# Patient Record
Sex: FEMALE | Race: WHITE | NOT HISPANIC OR LATINO | Employment: OTHER | URBAN - METROPOLITAN AREA
[De-identification: names, ages, dates, MRNs, and addresses within clinical notes are randomized per-mention and may not be internally consistent; named-entity substitution may affect disease eponyms.]

---

## 2022-06-27 LAB
ALBUMIN: 3.8 G/DL (ref 3.4–5)
ALP BLD-CCNC: 150 U/L (ref 33–136)
ALT SERPL-CCNC: 80 U/L (ref 7–45)
AST SERPL-CCNC: 119 U/L (ref 9–39)
BILIRUB SERPL-MCNC: 0.9 MG/DL (ref 0–1.2)
BILIRUBIN DIRECT: 0.2 MG/DL (ref 0–0.3)
INR BLD: 0.9 (ref 0.9–1.1)
PROTHROMBIN TIME: 10.4 SEC (ref 9.8–13.4)
TOTAL PROTEIN: 6.9 G/DL (ref 6.4–8.2)

## 2024-01-23 ENCOUNTER — OFFICE VISIT (OUTPATIENT)
Dept: PRIMARY CARE | Facility: CLINIC | Age: 64
End: 2024-01-23
Payer: COMMERCIAL

## 2024-01-23 VITALS
TEMPERATURE: 96.8 F | HEART RATE: 74 BPM | WEIGHT: 155.5 LBS | BODY MASS INDEX: 24.4 KG/M2 | OXYGEN SATURATION: 96 % | DIASTOLIC BLOOD PRESSURE: 68 MMHG | RESPIRATION RATE: 16 BRPM | HEIGHT: 67 IN | SYSTOLIC BLOOD PRESSURE: 124 MMHG

## 2024-01-23 DIAGNOSIS — K70.30 ALCOHOLIC CIRRHOSIS OF LIVER WITHOUT ASCITES (MULTI): ICD-10-CM

## 2024-01-23 DIAGNOSIS — C50.219 MALIGNANT NEOPLASM OF UPPER-INNER QUADRANT OF FEMALE BREAST, UNSPECIFIED ESTROGEN RECEPTOR STATUS, UNSPECIFIED LATERALITY (MULTI): ICD-10-CM

## 2024-01-23 DIAGNOSIS — F10.20 ALCOHOLISM (MULTI): ICD-10-CM

## 2024-01-23 DIAGNOSIS — I10 HYPERTENSION, UNSPECIFIED TYPE: ICD-10-CM

## 2024-01-23 DIAGNOSIS — M25.541 ARTHRALGIA OF BOTH HANDS: ICD-10-CM

## 2024-01-23 DIAGNOSIS — M25.542 ARTHRALGIA OF BOTH HANDS: ICD-10-CM

## 2024-01-23 DIAGNOSIS — F32.2 MODERATELY SEVERE MAJOR DEPRESSION (MULTI): ICD-10-CM

## 2024-01-23 DIAGNOSIS — J44.9 CHRONIC OBSTRUCTIVE PULMONARY DISEASE, UNSPECIFIED COPD TYPE (MULTI): ICD-10-CM

## 2024-01-23 DIAGNOSIS — E55.9 VITAMIN D DEFICIENCY: ICD-10-CM

## 2024-01-23 DIAGNOSIS — R74.8 ELEVATED LIVER ENZYMES: ICD-10-CM

## 2024-01-23 DIAGNOSIS — G61.81 CHRONIC INFLAMMATORY DEMYELINATING POLYNEUROPATHY (MULTI): ICD-10-CM

## 2024-01-23 DIAGNOSIS — N89.8 VAGINAL IRRITATION: ICD-10-CM

## 2024-01-23 DIAGNOSIS — M25.512 CHRONIC LEFT SHOULDER PAIN: Primary | ICD-10-CM

## 2024-01-23 DIAGNOSIS — G89.29 CHRONIC LEFT SHOULDER PAIN: Primary | ICD-10-CM

## 2024-01-23 DIAGNOSIS — K21.9 GASTROESOPHAGEAL REFLUX DISEASE WITHOUT ESOPHAGITIS: ICD-10-CM

## 2024-01-23 PROBLEM — F41.9 ANXIETY: Status: ACTIVE | Noted: 2024-01-23

## 2024-01-23 PROBLEM — M25.571 RIGHT ANKLE PAIN: Status: ACTIVE | Noted: 2024-01-23

## 2024-01-23 PROBLEM — S82.409A FRACTURE, TIBIA AND FIBULA: Status: ACTIVE | Noted: 2024-01-23

## 2024-01-23 PROBLEM — M17.31 POST-TRAUMATIC OSTEOARTHRITIS OF RIGHT KNEE: Status: ACTIVE | Noted: 2024-01-23

## 2024-01-23 PROBLEM — R53.82 CHRONIC FATIGUE, UNSPECIFIED: Status: ACTIVE | Noted: 2024-01-23

## 2024-01-23 PROBLEM — K25.4 CHRONIC GASTRIC ULCER WITH BLEEDING: Status: ACTIVE | Noted: 2024-01-23

## 2024-01-23 PROBLEM — K92.2 ACUTE GASTROINTESTINAL HEMORRHAGE: Status: ACTIVE | Noted: 2024-01-23

## 2024-01-23 PROBLEM — K61.1 PERIRECTAL ABSCESS: Status: ACTIVE | Noted: 2021-01-23

## 2024-01-23 PROBLEM — G60.9 IDIOPATHIC PERIPHERAL NEUROPATHY: Status: ACTIVE | Noted: 2024-01-23

## 2024-01-23 PROBLEM — S82.841A BIMALLEOLAR FRACTURE OF RIGHT ANKLE: Status: ACTIVE | Noted: 2024-01-23

## 2024-01-23 PROBLEM — R26.2 DIFFICULTY WALKING: Status: ACTIVE | Noted: 2024-01-23

## 2024-01-23 PROBLEM — F32.A DEPRESSION: Status: ACTIVE | Noted: 2024-01-23

## 2024-01-23 PROBLEM — D17.1 LIPOMA OF BUTTOCK: Status: ACTIVE | Noted: 2024-01-23

## 2024-01-23 PROBLEM — S82.209A FRACTURE, TIBIA AND FIBULA: Status: ACTIVE | Noted: 2024-01-23

## 2024-01-23 PROBLEM — M12.569 TRAUMATIC ARTHROPATHY OF KNEE: Status: ACTIVE | Noted: 2024-01-23

## 2024-01-23 PROBLEM — G25.81 RESTLESS LEGS SYNDROME (RLS): Status: ACTIVE | Noted: 2024-01-23

## 2024-01-23 PROBLEM — S69.90XA WRIST INJURY: Status: ACTIVE | Noted: 2024-01-23

## 2024-01-23 PROBLEM — M25.529 ELBOW PAIN: Status: ACTIVE | Noted: 2024-01-23

## 2024-01-23 PROBLEM — M10.9 GOUT: Status: ACTIVE | Noted: 2024-01-23

## 2024-01-23 PROBLEM — M76.821 POSTERIOR TIBIAL TENDINITIS OF RIGHT LEG: Status: ACTIVE | Noted: 2024-01-23

## 2024-01-23 PROBLEM — S99.919A ANKLE INJURY: Status: ACTIVE | Noted: 2024-01-23

## 2024-01-23 PROBLEM — F10.11 HISTORY OF ALCOHOL ABUSE: Status: ACTIVE | Noted: 2024-01-23

## 2024-01-23 PROBLEM — Z85.3 PERSONAL HISTORY OF MALIGNANT NEOPLASM OF BREAST: Status: ACTIVE | Noted: 2024-01-23

## 2024-01-23 PROBLEM — S82.871A PILON FRACTURE OF RIGHT TIBIA: Status: ACTIVE | Noted: 2024-01-23

## 2024-01-23 PROBLEM — F17.200 CURRENT SMOKER: Status: ACTIVE | Noted: 2024-01-23

## 2024-01-23 PROBLEM — S82.141D: Status: ACTIVE | Noted: 2024-01-23

## 2024-01-23 PROBLEM — S76.311A HAMSTRING STRAIN, RIGHT, INITIAL ENCOUNTER: Status: ACTIVE | Noted: 2024-01-23

## 2024-01-23 PROBLEM — M25.561 RIGHT KNEE PAIN: Status: ACTIVE | Noted: 2024-01-23

## 2024-01-23 PROCEDURE — 4004F PT TOBACCO SCREEN RCVD TLK: CPT | Performed by: NURSE PRACTITIONER

## 2024-01-23 PROCEDURE — 3078F DIAST BP <80 MM HG: CPT | Performed by: NURSE PRACTITIONER

## 2024-01-23 PROCEDURE — 99204 OFFICE O/P NEW MOD 45 MIN: CPT | Performed by: NURSE PRACTITIONER

## 2024-01-23 PROCEDURE — 3074F SYST BP LT 130 MM HG: CPT | Performed by: NURSE PRACTITIONER

## 2024-01-23 RX ORDER — MELOXICAM 7.5 MG/1
7.5 TABLET ORAL DAILY
Qty: 30 TABLET | Refills: 0 | Status: SHIPPED | OUTPATIENT
Start: 2024-01-23 | End: 2024-02-22

## 2024-01-23 RX ORDER — LORAZEPAM 1 MG/1
1 TABLET ORAL 2 TIMES DAILY PRN
COMMUNITY
End: 2024-04-04 | Stop reason: SDUPTHER

## 2024-01-23 RX ORDER — NYSTATIN 100000 U/G
CREAM TOPICAL 2 TIMES DAILY
Qty: 30 G | Refills: 0 | Status: SHIPPED | OUTPATIENT
Start: 2024-01-23 | End: 2024-01-30

## 2024-01-23 RX ORDER — ROPINIROLE 2 MG/1
TABLET, FILM COATED ORAL EVERY 24 HOURS
COMMUNITY
End: 2024-02-11 | Stop reason: SDUPTHER

## 2024-01-23 ASSESSMENT — PATIENT HEALTH QUESTIONNAIRE - PHQ9
SUM OF ALL RESPONSES TO PHQ9 QUESTIONS 1 AND 2: 0
1. LITTLE INTEREST OR PLEASURE IN DOING THINGS: NOT AT ALL
2. FEELING DOWN, DEPRESSED OR HOPELESS: NOT AT ALL

## 2024-01-23 ASSESSMENT — ENCOUNTER SYMPTOMS: ARTHRALGIAS: 1

## 2024-01-23 NOTE — PROGRESS NOTES
Subjective   Kylie Duong is a 63 y.o. female who presents for Arthritis (Arthritis). New patient appointment, referred by her sister Nereyda.     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 Her  committed suicide in 2017, he was manic depressive. She sips on alcohol throughout the day, and smokes a pack of cigarettes per day for the past 50 years/    Patient has a PCP dr Dmitriy Epstein, but would like to change PCP), states she did see Ferny few years ago   Arthritis- in AM fingers ,elbows, hips , pain , getting better during the day x 1 year ,last march 2023,she fell and hurt the head on the concrete, did not go to hosp at that time, she did see her PCP couple weeks later since that time she is been having problems   Rash , private are ,burning when using the bathroom x  1 month , lumps too, no pain now , no discharge ,  2 y ago she had SX procedure for the lumps and did not work    Arthritis  Presents for initial visit. The disease course has been stable. The condition has lasted for 1 year. She complains of pain. Affected locations include the right elbow, left elbow, right hip and left hip (fingers too). Associated symptoms include rash.     Review of Systems   Musculoskeletal:  Positive for arthralgias and arthritis.   Skin:  Positive for rash.       Objective   Physical Exam  Constitutional:       General: She is not in acute distress.     Appearance: She is not ill-appearing.   HENT:      Head: Normocephalic and atraumatic.      Mouth/Throat:      Mouth: Mucous membranes are moist.      Pharynx: Oropharynx is clear.   Eyes:      Conjunctiva/sclera: Conjunctivae normal.      Pupils: Pupils are equal, round, and reactive to light.   Cardiovascular:      Rate and Rhythm: Normal rate and regular rhythm.      Pulses: Normal pulses.      Heart sounds: Normal heart sounds.   Pulmonary:      Effort: Pulmonary effort is normal. No respiratory distress.      Breath  "sounds: Normal breath sounds.   Abdominal:      General: Bowel sounds are normal.      Palpations: Abdomen is soft.      Tenderness: There is no abdominal tenderness.   Musculoskeletal:         General: Normal range of motion.   Skin:     General: Skin is warm and dry.      Findings: Rash present. Rash is macular, scaling and urticarial.      Comments: Along underwear line   Neurological:      General: No focal deficit present.      Mental Status: She is alert and oriented to person, place, and time.   Psychiatric:         Mood and Affect: Mood normal.         Behavior: Behavior normal.         Thought Content: Thought content normal.         Judgment: Judgment normal.       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)    Moderately severe major depression (CMS/HCC)    Chronic obstructive pulmonary disease (CMS/HCC)    Relevant Orders    CBC and Auto Differential (Completed)    Lipid Panel (Completed)    Chronic inflammatory demyelinating polyneuropathy (CMS/HCC)    Alcoholic cirrhosis (CMS/HCC)    Alcoholism (CMS/HCC)     Other Visit Diagnoses       Chronic left shoulder pain    -  Primary    Relevant Medications    meloxicam (Mobic) 7.5 mg tablet    Other Relevant Orders    ZAY with Reflex to RAY (Completed)    Citrulline Antibody, IgG (Completed)    C-Reactive Protein (Completed)    Sedimentation Rate (Completed)    Rheumatoid Factor (Completed)    Vaginal irritation        Vitamin D deficiency        " Relevant Orders    Vitamin D 25-Hydroxy,Total (for eval of Vitamin D levels) (Completed)    Arthralgia of both hands        Relevant Orders    ZAY with Reflex to RAY (Completed)    Citrulline Antibody, IgG (Completed)    C-Reactive Protein (Completed)    Sedimentation Rate (Completed)    Rheumatoid Factor (Completed)          Patient Instructions   Patient to start taking mobic and nystatin daily as ordered. Have fasting labs drawn, and we will call with results when available. Follow-up in 1 month for physical, or sooner if needed. Call the office if any problems or concerns in the meantime.     More than 50% of the visit was spent counseling the patient. A total of more than 60 minutes was spent.

## 2024-01-24 ENCOUNTER — LAB (OUTPATIENT)
Dept: LAB | Facility: LAB | Age: 64
End: 2024-01-24
Payer: COMMERCIAL

## 2024-01-24 DIAGNOSIS — G89.29 CHRONIC LEFT SHOULDER PAIN: ICD-10-CM

## 2024-01-24 DIAGNOSIS — M25.542 ARTHRALGIA OF BOTH HANDS: ICD-10-CM

## 2024-01-24 DIAGNOSIS — E55.9 VITAMIN D DEFICIENCY: ICD-10-CM

## 2024-01-24 DIAGNOSIS — M25.512 CHRONIC LEFT SHOULDER PAIN: ICD-10-CM

## 2024-01-24 DIAGNOSIS — R74.8 ELEVATED LIVER ENZYMES: ICD-10-CM

## 2024-01-24 DIAGNOSIS — J44.9 CHRONIC OBSTRUCTIVE PULMONARY DISEASE, UNSPECIFIED COPD TYPE (MULTI): ICD-10-CM

## 2024-01-24 DIAGNOSIS — I10 HYPERTENSION, UNSPECIFIED TYPE: ICD-10-CM

## 2024-01-24 DIAGNOSIS — M25.541 ARTHRALGIA OF BOTH HANDS: ICD-10-CM

## 2024-01-24 LAB
25(OH)D3 SERPL-MCNC: 16 NG/ML (ref 30–100)
ALBUMIN SERPL BCP-MCNC: 3.9 G/DL (ref 3.4–5)
ALP SERPL-CCNC: 92 U/L (ref 33–136)
ALT SERPL W P-5'-P-CCNC: 11 U/L (ref 7–45)
ANION GAP SERPL CALC-SCNC: 12 MMOL/L (ref 10–20)
AST SERPL W P-5'-P-CCNC: 15 U/L (ref 9–39)
BASOPHILS # BLD AUTO: 0.05 X10*3/UL (ref 0–0.1)
BASOPHILS NFR BLD AUTO: 0.8 %
BILIRUB SERPL-MCNC: 0.8 MG/DL (ref 0–1.2)
BUN SERPL-MCNC: 12 MG/DL (ref 6–23)
CALCIUM SERPL-MCNC: 9.3 MG/DL (ref 8.6–10.3)
CHLORIDE SERPL-SCNC: 104 MMOL/L (ref 98–107)
CHOLEST SERPL-MCNC: 234 MG/DL (ref 0–199)
CHOLESTEROL/HDL RATIO: 5.3
CO2 SERPL-SCNC: 31 MMOL/L (ref 21–32)
CREAT SERPL-MCNC: 0.92 MG/DL (ref 0.5–1.05)
CRP SERPL-MCNC: 0.34 MG/DL
EGFRCR SERPLBLD CKD-EPI 2021: 70 ML/MIN/1.73M*2
EOSINOPHIL # BLD AUTO: 0.37 X10*3/UL (ref 0–0.7)
EOSINOPHIL NFR BLD AUTO: 6 %
ERYTHROCYTE [DISTWIDTH] IN BLOOD BY AUTOMATED COUNT: 14.6 % (ref 11.5–14.5)
ERYTHROCYTE [SEDIMENTATION RATE] IN BLOOD BY WESTERGREN METHOD: 14 MM/H (ref 0–30)
GLUCOSE SERPL-MCNC: 103 MG/DL (ref 74–99)
HCT VFR BLD AUTO: 46.1 % (ref 36–46)
HDLC SERPL-MCNC: 43.9 MG/DL
HGB BLD-MCNC: 16 G/DL (ref 12–16)
IMM GRANULOCYTES # BLD AUTO: 0.01 X10*3/UL (ref 0–0.7)
IMM GRANULOCYTES NFR BLD AUTO: 0.2 % (ref 0–0.9)
LDLC SERPL CALC-MCNC: 148 MG/DL
LYMPHOCYTES # BLD AUTO: 2.15 X10*3/UL (ref 1.2–4.8)
LYMPHOCYTES NFR BLD AUTO: 34.7 %
MCH RBC QN AUTO: 37.7 PG (ref 26–34)
MCHC RBC AUTO-ENTMCNC: 34.7 G/DL (ref 32–36)
MCV RBC AUTO: 109 FL (ref 80–100)
MONOCYTES # BLD AUTO: 0.45 X10*3/UL (ref 0.1–1)
MONOCYTES NFR BLD AUTO: 7.3 %
NEUTROPHILS # BLD AUTO: 3.17 X10*3/UL (ref 1.2–7.7)
NEUTROPHILS NFR BLD AUTO: 51 %
NON HDL CHOLESTEROL: 190 MG/DL (ref 0–149)
NRBC BLD-RTO: 0 /100 WBCS (ref 0–0)
PLATELET # BLD AUTO: 255 X10*3/UL (ref 150–450)
POTASSIUM SERPL-SCNC: 3.8 MMOL/L (ref 3.5–5.3)
PROT SERPL-MCNC: 6.5 G/DL (ref 6.4–8.2)
RBC # BLD AUTO: 4.24 X10*6/UL (ref 4–5.2)
SODIUM SERPL-SCNC: 143 MMOL/L (ref 136–145)
TRIGL SERPL-MCNC: 212 MG/DL (ref 0–149)
TSH SERPL-ACNC: 2.2 MIU/L (ref 0.44–3.98)
VLDL: 42 MG/DL (ref 0–40)
WBC # BLD AUTO: 6.2 X10*3/UL (ref 4.4–11.3)

## 2024-01-24 PROCEDURE — 86431 RHEUMATOID FACTOR QUANT: CPT

## 2024-01-24 PROCEDURE — 80061 LIPID PANEL: CPT

## 2024-01-24 PROCEDURE — 82306 VITAMIN D 25 HYDROXY: CPT

## 2024-01-24 PROCEDURE — 85025 COMPLETE CBC W/AUTO DIFF WBC: CPT

## 2024-01-24 PROCEDURE — 84443 ASSAY THYROID STIM HORMONE: CPT

## 2024-01-24 PROCEDURE — 86140 C-REACTIVE PROTEIN: CPT

## 2024-01-24 PROCEDURE — 80053 COMPREHEN METABOLIC PANEL: CPT

## 2024-01-24 PROCEDURE — 86200 CCP ANTIBODY: CPT

## 2024-01-24 PROCEDURE — 36415 COLL VENOUS BLD VENIPUNCTURE: CPT

## 2024-01-24 PROCEDURE — 86038 ANTINUCLEAR ANTIBODIES: CPT

## 2024-01-24 PROCEDURE — 85652 RBC SED RATE AUTOMATED: CPT

## 2024-01-25 LAB
ANA SER QL HEP2 SUBST: NEGATIVE
CCP IGG SERPL-ACNC: <1 U/ML
RHEUMATOID FACT SER NEPH-ACNC: <10 IU/ML (ref 0–15)

## 2024-02-06 ENCOUNTER — OFFICE VISIT (OUTPATIENT)
Dept: PRIMARY CARE | Facility: CLINIC | Age: 64
End: 2024-02-06
Payer: COMMERCIAL

## 2024-02-06 VITALS
HEART RATE: 75 BPM | WEIGHT: 153.8 LBS | DIASTOLIC BLOOD PRESSURE: 62 MMHG | RESPIRATION RATE: 18 BRPM | BODY MASS INDEX: 24.14 KG/M2 | SYSTOLIC BLOOD PRESSURE: 130 MMHG | OXYGEN SATURATION: 98 % | HEIGHT: 67 IN | TEMPERATURE: 98 F

## 2024-02-06 DIAGNOSIS — L20.9 ATOPIC DERMATITIS IN ADULT: Primary | ICD-10-CM

## 2024-02-06 PROCEDURE — 4004F PT TOBACCO SCREEN RCVD TLK: CPT | Performed by: NURSE PRACTITIONER

## 2024-02-06 PROCEDURE — 3075F SYST BP GE 130 - 139MM HG: CPT | Performed by: NURSE PRACTITIONER

## 2024-02-06 PROCEDURE — 3078F DIAST BP <80 MM HG: CPT | Performed by: NURSE PRACTITIONER

## 2024-02-06 PROCEDURE — 99212 OFFICE O/P EST SF 10 MIN: CPT | Performed by: NURSE PRACTITIONER

## 2024-02-06 RX ORDER — CLOBETASOL PROPIONATE 0.5 MG/G
CREAM TOPICAL 2 TIMES DAILY
Qty: 15 G | Refills: 0 | Status: SHIPPED | OUTPATIENT
Start: 2024-02-06 | End: 2024-02-20

## 2024-02-06 RX ORDER — KETOCONAZOLE 20 MG/G
CREAM TOPICAL 2 TIMES DAILY
Qty: 30 G | Refills: 0 | Status: SHIPPED | OUTPATIENT
Start: 2024-02-06 | End: 2024-02-20

## 2024-02-06 ASSESSMENT — ENCOUNTER SYMPTOMS: ROS SKIN COMMENTS: AS PER HPI

## 2024-02-06 NOTE — PROGRESS NOTES
"Subjective   Patient ID: Kylie Duong is a 63 y.o. female who presents for Rash (LEFT HAND ON THE PALM. ITCHES AND BURNS/COUPLE OF WEEKS. STARTED TO LOOK LIKE A DOT AND KEEPS SPREADING).    The rash is limited to her LT palm, started out as one red dot and is spreading but clumped to one area, there are red patches, scaling areas. She states it hurts and itches. May be seborrheic dermatitis verse atopic.         Review of Systems   Skin:  Positive for rash.        As per HPI       Objective   /62   Pulse 75   Temp 36.7 °C (98 °F)   Resp 18   Ht 1.697 m (5' 6.8\")   Wt 69.8 kg (153 lb 12.8 oz)   SpO2 98%   BMI 24.23 kg/m²     Physical Exam  Vitals and nursing note reviewed.   Constitutional:       General: She is not in acute distress.  HENT:      Head: Normocephalic and atraumatic.   Eyes:      Pupils: Pupils are equal, round, and reactive to light.   Pulmonary:      Effort: Pulmonary effort is normal.   Skin:     General: Skin is warm and dry.      Findings: Erythema and rash present.      Comments: Clumped area on Left palm of red, scaly patches   Neurological:      Mental Status: She is alert.   Psychiatric:         Mood and Affect: Mood normal.         Assessment/Plan   Problem List Items Addressed This Visit    None  Visit Diagnoses         Codes    Atopic dermatitis in adult    -  Primary L20.9    verses seborrheic dermatitis  To use both creams BID x 14 days  If not improving please call     Relevant Medications    clobetasol (Temovate) 0.05 % cream    ketoconazole (NIZOral) 2 % cream               "

## 2024-02-09 ENCOUNTER — TELEPHONE (OUTPATIENT)
Dept: PRIMARY CARE | Facility: CLINIC | Age: 64
End: 2024-02-09
Payer: COMMERCIAL

## 2024-02-09 NOTE — TELEPHONE ENCOUNTER
Patient LMS stating she needs refill on  ropinirole  to be sent to [facility] in [place] .   Thank you !

## 2024-02-11 DIAGNOSIS — G25.81 RESTLESS LEGS SYNDROME (RLS): Primary | ICD-10-CM

## 2024-02-11 RX ORDER — ROPINIROLE 2 MG/1
2 TABLET, FILM COATED ORAL NIGHTLY
Qty: 90 TABLET | Refills: 1 | Status: SHIPPED | OUTPATIENT
Start: 2024-02-11 | End: 2024-02-27 | Stop reason: SDUPTHER

## 2024-02-12 NOTE — PATIENT INSTRUCTIONS
Patient to start taking mobic and nystatin daily as ordered. Have fasting labs drawn, and we will call with results when available. Follow-up in 1 month for physical, or sooner if needed. Call the office if any problems or concerns in the meantime.     More than 50% of the visit was spent counseling the patient. A total of more than 60 minutes was spent.

## 2024-02-27 DIAGNOSIS — G25.81 RESTLESS LEGS SYNDROME (RLS): ICD-10-CM

## 2024-02-27 RX ORDER — ROPINIROLE 2 MG/1
2 TABLET, FILM COATED ORAL NIGHTLY
Qty: 90 TABLET | Refills: 1 | Status: SHIPPED | OUTPATIENT
Start: 2024-02-27 | End: 2024-08-25

## 2024-02-29 ENCOUNTER — OFFICE VISIT (OUTPATIENT)
Dept: ORTHOPEDIC SURGERY | Facility: CLINIC | Age: 64
End: 2024-02-29
Payer: COMMERCIAL

## 2024-02-29 ENCOUNTER — HOSPITAL ENCOUNTER (OUTPATIENT)
Dept: RADIOLOGY | Facility: CLINIC | Age: 64
Discharge: HOME | End: 2024-02-29
Payer: COMMERCIAL

## 2024-02-29 DIAGNOSIS — M79.671 RIGHT FOOT PAIN: ICD-10-CM

## 2024-02-29 DIAGNOSIS — M20.21 HALLUX RIGIDUS, ACQUIRED, RIGHT: Primary | ICD-10-CM

## 2024-02-29 PROCEDURE — 99214 OFFICE O/P EST MOD 30 MIN: CPT | Performed by: PHYSICIAN ASSISTANT

## 2024-02-29 PROCEDURE — 73630 X-RAY EXAM OF FOOT: CPT | Mod: RIGHT SIDE | Performed by: RADIOLOGY

## 2024-02-29 PROCEDURE — 73630 X-RAY EXAM OF FOOT: CPT | Mod: RT

## 2024-02-29 PROCEDURE — 4004F PT TOBACCO SCREEN RCVD TLK: CPT | Performed by: PHYSICIAN ASSISTANT

## 2024-02-29 PROCEDURE — L4361 PNEUMA/VAC WALK BOOT PRE OTS: HCPCS | Performed by: PHYSICIAN ASSISTANT

## 2024-02-29 NOTE — PATIENT INSTRUCTIONS
YOUR BOOT INSTRUCTIONS:  You can put weight on your foot and ankle while in the boot.    You can use crutches or walker for support as needed.   You should wear boot when walking or standing   You can take off your boot while bathing, sitting, stretching, icing or doing therapy exercises.  You can take your boot off at nighttime while sleeping.    BOOT WEANING:  DAY 1-- come out of boot for 1 hour (wear supportive athletic shoes and orthotic inserts), rest of the day in boot  DAY 2-- come out of boot for 2 hours (wear supportive athletic shoes and orthotic inserts), rest of the day in boot  DAY 3-- come out of boot for 3 hours (wear supportive athletic shoes and orthotic inserts), rest of the day in boot  DAY 4-- come out of boot for 4 hours (wear supportive athletic shoes and orthotic inserts), rest of the day in boot  DAY 5-- come out of boot and wear supportive shoes and orthotic inserts  * if you have pain while weaning out of boot then go back one day in the protocol (i.e. If really sore on the morning of Day 3 from coming out of boot for 2 hours the previous day, go back to Day 1 and start again through the protocol)    You can also use OTC Voltaren gel or  aspercreme ointment and apply it to the injured area.      Ice and elevate supported at the calf to reduce swelling    You can use a bucket of room temperature water with Epson salt and do your ankle/toe exercises    Patient is ambulatory and was given a prescription for orthotics with Beard's extension carbon fiber plate, which are medically necessary due to the patient's medical condition and symptomatic pes planus.    To help support your foot, you can purchase inserts over the counter. You want to look for full length inserts with a foam arch (not flat gel ones). Brands such as [employer] or others work well. Full length inserts give a little more support than the short ones. But, if you use a full length insert, you often will have to remove  the insole that came with the shoe and then put the insert you buy then in the shoe      Follow up as needed

## 2024-03-06 NOTE — PROGRESS NOTES
NPV-   History of Present Illness  63 y.o.female here right foot pain  1. Hallux rigidus, acquired, right  Walking Boot Short      2. Right foot pain  XR foot right 3+ views      Mechanism of injury: none  Date of Injury/Pain: 1-2 months  Location of pain: right great toe  Frequency of Pain: worse with walking or standing  Associated symptoms? Swelling.  Previous treatment?  none        27 point review of systems negative except what is stated in HPI    On examination of the right ankle/foot:  Normal gait  Pes planus alignment  Minimal swelling and no ecchymosis of the lateral foot. no erythema. Skin intact; no ulcers or lesions.   Normal ROM in  ankle plantarflexion, dorsiflexion, inversion and eversion; minimal Normal ROM in 2-5th toe flexion/extension and decreased 1st MTP flexion/extension  Normal strength in ankle plantarflexion, dorsiflexion, inversion and eversion; normal strength with great toe flexion/extension. No pain with eversion  Tenderness to palpation: 1st MTP  No tenderness to palpation over the Achilles, medial and lateral malleolus, posterior tibial tendon, peroneal tendon, ATFL, deltoid ligament, talus or navicular.  no tenderness to palpation over heel, plantar arch, 5th metatarsal, base of 1st metatarsal/2nd metatarsal, sesamoids, medial cuneiform, navicular, 1st MTP joint or 2nd or 3rd intermetarsal space.  Neurovascularly intact. Good capillary refill. No sensory deficit to light touch. Normal proprioception. Dorsalis pedis and posterior tibial pulses 2+ bilaterally.    - Villeda's test                              - Dorsiflexion-eversion test  - Anterior Drawer test                      - resisted dorsiflexion-eversion test  - Talar tilt test                                    - shuck testing  - Squeeze test      I personally reviewed the patient's x-ray images and reports of the right foot foot. The xrays show severe degenerative changes with joint space narrowing at 1st MTP joint. No  other fractures or dislocation.      ASSESSMENT: right foot hallux rigidus, b/l PTTD grade 2     PLAN: Treatment options were discussed with the patient. Patient was placed in a short CAM walker boot to be WBAT with crutches.  They were given boot instructions. Patient is ambulatory and was given a prescription for orthotics with Beard's extension carbon fiber plate, which are medically necessary due to the patient's medical condition and symptomatic pes planus. All the patient's questions were answered. The patient agrees with the above plan.  Follow up as needed    Patient was prescribed a CAM walker boot for hallux rigidus. The patient is ambulatory with or without aid; but, has weakness, instability and/or deformity of their right ankle/foot which requires stabilization from this orthosis to improve their function.      Verbal and written instructions for the use, wear schedule, cleaning and application of this item were given.  Patient was instructed that should the brace result in increased pain, decreased sensation, increased swelling, or an overall worsening of their medical condition, to please contact our office immediately.     Orthotic management and training was provided for skin care, modifications due to healing tissues, edema changes, interruption in skin integrity, and safety precautions with the orthosis.

## 2024-03-14 DIAGNOSIS — E78.2 MIXED HYPERLIPIDEMIA: ICD-10-CM

## 2024-03-14 DIAGNOSIS — E55.9 VITAMIN D DEFICIENCY: Primary | ICD-10-CM

## 2024-03-14 RX ORDER — ERGOCALCIFEROL 1.25 MG/1
50000 CAPSULE ORAL
Qty: 12 CAPSULE | Refills: 0 | Status: SHIPPED | OUTPATIENT
Start: 2024-03-14 | End: 2024-04-04 | Stop reason: ALTCHOICE

## 2024-04-02 ENCOUNTER — TELEPHONE (OUTPATIENT)
Dept: PRIMARY CARE | Facility: CLINIC | Age: 64
End: 2024-04-02
Payer: COMMERCIAL

## 2024-04-02 NOTE — TELEPHONE ENCOUNTER
Patient states she is being treated for an infection between her legs.    She has developed extremely painful carbuncles.  There is some drainage but she thinks they need to be completely drained.    Please help!

## 2024-04-04 ENCOUNTER — OFFICE VISIT (OUTPATIENT)
Dept: PRIMARY CARE | Facility: CLINIC | Age: 64
End: 2024-04-04
Payer: COMMERCIAL

## 2024-04-04 VITALS
RESPIRATION RATE: 17 BRPM | WEIGHT: 152 LBS | SYSTOLIC BLOOD PRESSURE: 128 MMHG | DIASTOLIC BLOOD PRESSURE: 86 MMHG | OXYGEN SATURATION: 95 % | BODY MASS INDEX: 23.95 KG/M2 | HEART RATE: 71 BPM | TEMPERATURE: 97.9 F

## 2024-04-04 DIAGNOSIS — L03.317 CELLULITIS OF BUTTOCK: Primary | ICD-10-CM

## 2024-04-04 DIAGNOSIS — F41.9 ANXIETY: ICD-10-CM

## 2024-04-04 PROCEDURE — 3074F SYST BP LT 130 MM HG: CPT | Performed by: NURSE PRACTITIONER

## 2024-04-04 PROCEDURE — 99214 OFFICE O/P EST MOD 30 MIN: CPT | Performed by: NURSE PRACTITIONER

## 2024-04-04 PROCEDURE — 1159F MED LIST DOCD IN RCRD: CPT | Performed by: NURSE PRACTITIONER

## 2024-04-04 PROCEDURE — 3079F DIAST BP 80-89 MM HG: CPT | Performed by: NURSE PRACTITIONER

## 2024-04-04 PROCEDURE — 1160F RVW MEDS BY RX/DR IN RCRD: CPT | Performed by: NURSE PRACTITIONER

## 2024-04-04 RX ORDER — DOXYCYCLINE 100 MG/1
100 CAPSULE ORAL 2 TIMES DAILY
Qty: 14 CAPSULE | Refills: 0 | Status: SHIPPED | OUTPATIENT
Start: 2024-04-04 | End: 2024-04-11

## 2024-04-04 RX ORDER — MUPIROCIN 20 MG/G
1 OINTMENT TOPICAL 3 TIMES DAILY
Qty: 22 G | Refills: 0 | Status: SHIPPED | OUTPATIENT
Start: 2024-04-04 | End: 2024-06-16

## 2024-04-04 RX ORDER — LORAZEPAM 1 MG/1
1 TABLET ORAL NIGHTLY
Qty: 30 TABLET | Refills: 0 | Status: SHIPPED | OUTPATIENT
Start: 2024-04-04 | End: 2024-05-28 | Stop reason: SDUPTHER

## 2024-04-04 ASSESSMENT — ENCOUNTER SYMPTOMS
ABDOMINAL PAIN: 0
CONFUSION: 0
FEVER: 0
WEAKNESS: 0
RHINORRHEA: 0
CHILLS: 0
SINUS PAIN: 0
CONSTIPATION: 0
WOUND: 0
HEADACHES: 0
SORE THROAT: 0
NUMBNESS: 0
DYSURIA: 0
NAUSEA: 0
VOMITING: 0
MYALGIAS: 0
SHORTNESS OF BREATH: 0
CHEST TIGHTNESS: 0
EYE PAIN: 0
LIGHT-HEADEDNESS: 0
DECREASED CONCENTRATION: 0
COUGH: 0
ARTHRALGIAS: 0
SINUS PRESSURE: 0
DIARRHEA: 0

## 2024-04-04 NOTE — PROGRESS NOTES
Subjective   Patient ID: Kylie Duong is a 64 y.o. female who presents for Cyst.    Rash  This is a recurrent problem. The current episode started 1 to 4 weeks ago. The problem has been gradually worsening since onset. The affected locations include the right upper leg (inside of thigh). The rash is characterized by pain, redness, swelling, draining and itchiness. She was exposed to nothing. Associated symptoms include joint pain. Pertinent negatives include no cough, diarrhea, facial edema, rhinorrhea, shortness of breath, sore throat or vomiting. Past treatments include anti-itch cream. The treatment provided mild relief.        Review of Systems   HENT:  Negative for rhinorrhea and sore throat.    Respiratory:  Negative for cough and shortness of breath.    Gastrointestinal:  Negative for diarrhea and vomiting.   Musculoskeletal:  Positive for joint pain.   Skin:  Positive for rash.       Objective   /86   Pulse 71   Temp 36.6 °C (97.9 °F) (Temporal)   Resp 17   Wt 68.9 kg (152 lb)   SpO2 95%   BMI 23.95 kg/m²     Physical Exam  Constitutional:       Appearance: Normal appearance.   Cardiovascular:      Rate and Rhythm: Normal rate and regular rhythm.      Pulses: Normal pulses.      Heart sounds: Normal heart sounds.   Pulmonary:      Effort: Pulmonary effort is normal.      Breath sounds: Normal breath sounds.   Abdominal:      General: Bowel sounds are normal.      Palpations: Abdomen is soft.   Musculoskeletal:         General: Normal range of motion.   Skin:     General: Skin is warm and dry.      Comments: Small perianal cyst on right buttock, purulent drainage noted.     Neurological:      Mental Status: She is alert.   Psychiatric:         Mood and Affect: Mood is anxious. Mood is not depressed. Affect is not labile, flat or tearful.         Behavior: Behavior normal.         Assessment/Plan   Problem List Items Addressed This Visit       Anxiety    Relevant Medications    LORazepam (Ativan) 1  mg tablet     Other Visit Diagnoses       Cellulitis of buttock    -  Primary    Relevant Medications    mupirocin (Bactroban) 2 % ointment    doxycycline (Vibramycin) 100 mg capsule          Patient Instructions   Patient to start taking doxycycline and bactroban ointment as ordered, and tylenol and ibuprofen as needed. Will refer to derm/general surgery if no improvement. Follow-up in 2 weeks, or sooner if needed. Call the office if any problems or concerns in the meantime.

## 2024-04-04 NOTE — PROGRESS NOTES
Subjective   Patient ID: Kylie Duong is a 64 y.o. female who presents for Cyst.    Patient presents today with complaints of rash in groin.  She says the rash is mainly on the right and consists of pustules that she has been able to resolve using heat.  She said that the drainage is beige in color.  She denies any fevers.  She does say that the area is pruritic at times.         Review of Systems   Constitutional:  Negative for chills and fever.   HENT:  Negative for congestion, sinus pressure, sinus pain and sore throat.    Eyes:  Negative for pain.   Respiratory:  Negative for chest tightness and shortness of breath.    Cardiovascular:  Negative for chest pain.   Gastrointestinal:  Negative for abdominal pain, constipation, diarrhea, nausea and vomiting.   Genitourinary:  Negative for dysuria.   Musculoskeletal:  Negative for arthralgias and myalgias.   Skin:  Positive for rash. Negative for wound.   Neurological:  Negative for weakness, light-headedness, numbness and headaches.   Psychiatric/Behavioral:  Negative for confusion and decreased concentration.        Objective   /86   Pulse 71   Temp 36.6 °C (97.9 °F) (Temporal)   Resp 17   Wt 68.9 kg (152 lb)   SpO2 95%   BMI 23.95 kg/m²     Physical Exam    Assessment/Plan

## 2024-04-07 NOTE — PATIENT INSTRUCTIONS
Patient to start taking doxycycline and bactroban ointment as ordered, and tylenol and ibuprofen as needed. Will refer to derm/general surgery if no improvement. Follow-up in 2 weeks, or sooner if needed. Call the office if any problems or concerns in the meantime.

## 2024-04-11 ENCOUNTER — TELEPHONE (OUTPATIENT)
Dept: PRIMARY CARE | Facility: CLINIC | Age: 64
End: 2024-04-11
Payer: COMMERCIAL

## 2024-04-11 DIAGNOSIS — L03.317 CELLULITIS OF BUTTOCK: Primary | ICD-10-CM

## 2024-04-11 RX ORDER — CEPHALEXIN 500 MG/1
500 CAPSULE ORAL 3 TIMES DAILY
Qty: 30 CAPSULE | Refills: 0 | Status: SHIPPED | OUTPATIENT
Start: 2024-04-11 | End: 2024-04-21

## 2024-04-11 NOTE — TELEPHONE ENCOUNTER
Patient stated she was prescribed   doxycycline (Vibramycin) 100 mg capsule  for an infection.    It has been making her sick.  She's had a major headache, throwing up and diarrhea.      She has 3 pills left and she doesn't think she can finish them but she also wants to get rid of the infection.      Question:  If she quits taking them now with that negate the affects of the last seven days?     Does she need to force herself to finish the last 3 pills?      Please call her ASAP

## 2024-04-11 NOTE — TELEPHONE ENCOUNTER
I spoke with pt and she said she will stop taking the Doxy. She said her infection is mostly resolved.

## 2024-04-22 ENCOUNTER — APPOINTMENT (OUTPATIENT)
Dept: PRIMARY CARE | Facility: CLINIC | Age: 64
End: 2024-04-22
Payer: COMMERCIAL

## 2024-05-13 ENCOUNTER — TELEPHONE (OUTPATIENT)
Dept: PRIMARY CARE | Facility: CLINIC | Age: 64
End: 2024-05-13
Payer: COMMERCIAL

## 2024-05-13 DIAGNOSIS — L20.9 ATOPIC DERMATITIS IN ADULT: Primary | ICD-10-CM

## 2024-05-13 NOTE — TELEPHONE ENCOUNTER
Patient saw Leslie Pickens in February for a rash on the palm of her hand. She was given a cream. It is not working.  It is not going away, it is spreading and she now has blisters.      Patient thinks she may need a referral to a dermatologist.    Please advise.

## 2024-05-28 DIAGNOSIS — F41.9 ANXIETY: ICD-10-CM

## 2024-05-28 RX ORDER — LORAZEPAM 1 MG/1
1 TABLET ORAL NIGHTLY
Qty: 10 TABLET | Refills: 0 | Status: SHIPPED | OUTPATIENT
Start: 2024-05-28 | End: 2024-06-07

## 2024-05-31 ENCOUNTER — TELEPHONE (OUTPATIENT)
Dept: PRIMARY CARE | Facility: CLINIC | Age: 64
End: 2024-05-31
Payer: COMMERCIAL

## 2024-05-31 NOTE — TELEPHONE ENCOUNTER
Patient is confused why she only received 10 pills of     LORazepam (Ativan) 1 mg tablet [[id number]]     Instead of 30 .    Please advise      Thank you !

## 2024-06-03 NOTE — TELEPHONE ENCOUNTER
Pt is aware that she needs to make an appt if she wants to take more than 30 pills of ativan per year.

## 2024-06-25 ENCOUNTER — HOSPITAL ENCOUNTER (EMERGENCY)
Facility: HOSPITAL | Age: 64
Discharge: HOME | End: 2024-06-25
Attending: EMERGENCY MEDICINE
Payer: MEDICARE

## 2024-06-25 ENCOUNTER — APPOINTMENT (OUTPATIENT)
Dept: RADIOLOGY | Facility: HOSPITAL | Age: 64
End: 2024-06-25
Payer: MEDICARE

## 2024-06-25 VITALS
OXYGEN SATURATION: 95 % | BODY MASS INDEX: 22.76 KG/M2 | TEMPERATURE: 97.5 F | HEART RATE: 90 BPM | WEIGHT: 145 LBS | DIASTOLIC BLOOD PRESSURE: 80 MMHG | SYSTOLIC BLOOD PRESSURE: 132 MMHG | RESPIRATION RATE: 18 BRPM | HEIGHT: 67 IN

## 2024-06-25 DIAGNOSIS — M25.521 RIGHT ELBOW PAIN: Primary | ICD-10-CM

## 2024-06-25 DIAGNOSIS — G25.81 RESTLESS LEGS SYNDROME (RLS): ICD-10-CM

## 2024-06-25 DIAGNOSIS — I10 HYPERTENSION, UNSPECIFIED TYPE: ICD-10-CM

## 2024-06-25 DIAGNOSIS — R73.9 HYPERGLYCEMIA, UNSPECIFIED: ICD-10-CM

## 2024-06-25 DIAGNOSIS — J44.9 CHRONIC OBSTRUCTIVE PULMONARY DISEASE, UNSPECIFIED COPD TYPE (MULTI): ICD-10-CM

## 2024-06-25 LAB
ALBUMIN SERPL BCP-MCNC: 4 G/DL (ref 3.4–5)
ALP SERPL-CCNC: 85 U/L (ref 33–136)
ALT SERPL W P-5'-P-CCNC: 9 U/L (ref 7–45)
ANION GAP SERPL CALC-SCNC: 13 MMOL/L (ref 10–20)
AST SERPL W P-5'-P-CCNC: 13 U/L (ref 9–39)
BASOPHILS # BLD AUTO: 0.03 X10*3/UL (ref 0–0.1)
BASOPHILS NFR BLD AUTO: 0.3 %
BILIRUB SERPL-MCNC: 0.9 MG/DL (ref 0–1.2)
BUN SERPL-MCNC: 12 MG/DL (ref 6–23)
CALCIUM SERPL-MCNC: 9.6 MG/DL (ref 8.6–10.3)
CHLORIDE SERPL-SCNC: 99 MMOL/L (ref 98–107)
CO2 SERPL-SCNC: 31 MMOL/L (ref 21–32)
CREAT SERPL-MCNC: 0.75 MG/DL (ref 0.5–1.05)
EGFRCR SERPLBLD CKD-EPI 2021: 88 ML/MIN/1.73M*2
EOSINOPHIL # BLD AUTO: 0.25 X10*3/UL (ref 0–0.7)
EOSINOPHIL NFR BLD AUTO: 2.5 %
ERYTHROCYTE [DISTWIDTH] IN BLOOD BY AUTOMATED COUNT: 13.1 % (ref 11.5–14.5)
GLUCOSE SERPL-MCNC: 101 MG/DL (ref 74–99)
HCT VFR BLD AUTO: 45.4 % (ref 36–46)
HGB BLD-MCNC: 15.7 G/DL (ref 12–16)
HOLD SPECIMEN: NORMAL
IMM GRANULOCYTES # BLD AUTO: 0.04 X10*3/UL (ref 0–0.7)
IMM GRANULOCYTES NFR BLD AUTO: 0.4 % (ref 0–0.9)
LYMPHOCYTES # BLD AUTO: 1.92 X10*3/UL (ref 1.2–4.8)
LYMPHOCYTES NFR BLD AUTO: 18.9 %
MCH RBC QN AUTO: 37.2 PG (ref 26–34)
MCHC RBC AUTO-ENTMCNC: 34.6 G/DL (ref 32–36)
MCV RBC AUTO: 108 FL (ref 80–100)
MONOCYTES # BLD AUTO: 0.76 X10*3/UL (ref 0.1–1)
MONOCYTES NFR BLD AUTO: 7.5 %
NEUTROPHILS # BLD AUTO: 7.17 X10*3/UL (ref 1.2–7.7)
NEUTROPHILS NFR BLD AUTO: 70.4 %
NRBC BLD-RTO: 0 /100 WBCS (ref 0–0)
PLATELET # BLD AUTO: 273 X10*3/UL (ref 150–450)
POTASSIUM SERPL-SCNC: 4.2 MMOL/L (ref 3.5–5.3)
PROT SERPL-MCNC: 7.2 G/DL (ref 6.4–8.2)
RBC # BLD AUTO: 4.22 X10*6/UL (ref 4–5.2)
SODIUM SERPL-SCNC: 139 MMOL/L (ref 136–145)
URATE SERPL-MCNC: 5.6 MG/DL (ref 2.3–6.7)
WBC # BLD AUTO: 10.2 X10*3/UL (ref 4.4–11.3)

## 2024-06-25 PROCEDURE — 85025 COMPLETE CBC W/AUTO DIFF WBC: CPT

## 2024-06-25 PROCEDURE — 80061 LIPID PANEL: CPT | Performed by: NURSE PRACTITIONER

## 2024-06-25 PROCEDURE — 96374 THER/PROPH/DIAG INJ IV PUSH: CPT

## 2024-06-25 PROCEDURE — 83036 HEMOGLOBIN GLYCOSYLATED A1C: CPT | Mod: STJLAB | Performed by: NURSE PRACTITIONER

## 2024-06-25 PROCEDURE — 73080 X-RAY EXAM OF ELBOW: CPT | Mod: RIGHT SIDE | Performed by: RADIOLOGY

## 2024-06-25 PROCEDURE — 2500000004 HC RX 250 GENERAL PHARMACY W/ HCPCS (ALT 636 FOR OP/ED)

## 2024-06-25 PROCEDURE — 84075 ASSAY ALKALINE PHOSPHATASE: CPT

## 2024-06-25 PROCEDURE — 36415 COLL VENOUS BLD VENIPUNCTURE: CPT

## 2024-06-25 PROCEDURE — 84550 ASSAY OF BLOOD/URIC ACID: CPT

## 2024-06-25 PROCEDURE — 99284 EMERGENCY DEPT VISIT MOD MDM: CPT

## 2024-06-25 PROCEDURE — 73080 X-RAY EXAM OF ELBOW: CPT | Mod: RT

## 2024-06-25 RX ORDER — MORPHINE SULFATE 2 MG/ML
1 INJECTION, SOLUTION INTRAMUSCULAR; INTRAVENOUS ONCE
Status: COMPLETED | OUTPATIENT
Start: 2024-06-25 | End: 2024-06-25

## 2024-06-25 RX ORDER — ROPINIROLE 2 MG/1
2 TABLET, FILM COATED ORAL NIGHTLY
Qty: 90 TABLET | Refills: 0 | Status: SHIPPED | OUTPATIENT
Start: 2024-06-25 | End: 2024-09-23

## 2024-06-25 RX ORDER — OXYCODONE AND ACETAMINOPHEN 5; 325 MG/1; MG/1
1 TABLET ORAL EVERY 6 HOURS PRN
Qty: 3 TABLET | Refills: 0 | Status: SHIPPED | OUTPATIENT
Start: 2024-06-25 | End: 2024-06-28

## 2024-06-25 ASSESSMENT — LIFESTYLE VARIABLES
EVER HAD A DRINK FIRST THING IN THE MORNING TO STEADY YOUR NERVES TO GET RID OF A HANGOVER: NO
HAVE YOU EVER FELT YOU SHOULD CUT DOWN ON YOUR DRINKING: NO
EVER FELT BAD OR GUILTY ABOUT YOUR DRINKING: NO
TOTAL SCORE: 0
HAVE PEOPLE ANNOYED YOU BY CRITICIZING YOUR DRINKING: NO

## 2024-06-25 ASSESSMENT — PAIN SCALES - GENERAL: PAINLEVEL_OUTOF10: 9

## 2024-06-25 ASSESSMENT — COLUMBIA-SUICIDE SEVERITY RATING SCALE - C-SSRS
6. HAVE YOU EVER DONE ANYTHING, STARTED TO DO ANYTHING, OR PREPARED TO DO ANYTHING TO END YOUR LIFE?: NO
1. IN THE PAST MONTH, HAVE YOU WISHED YOU WERE DEAD OR WISHED YOU COULD GO TO SLEEP AND NOT WAKE UP?: NO
2. HAVE YOU ACTUALLY HAD ANY THOUGHTS OF KILLING YOURSELF?: NO

## 2024-06-25 ASSESSMENT — PAIN - FUNCTIONAL ASSESSMENT: PAIN_FUNCTIONAL_ASSESSMENT: 0-10

## 2024-06-25 ASSESSMENT — PAIN DESCRIPTION - LOCATION: LOCATION: ELBOW

## 2024-06-25 NOTE — ED PROVIDER NOTES
EMERGENCY DEPARTMENT ENCOUNTER      Pt Name: Kylie Duong  MRN: [id number]  Birthdate 1960  Date of evaluation: 6/25/2024  Provider: rGaeme Castellano MD    CHIEF COMPLAINT       Chief Complaint   Patient presents with    Elbow Pain     Patient states elbow pain for 5 days no known injury     HISTORY OF PRESENT ILLNESS    HPI  64-year-old female presents to the emergency department with chief complaint of elbow pain.  Patient endorses that her right elbow has been swollen and painful for the past 5 days.  She cannot straighten or bend it.  She denies any fever nausea vomiting or any other alarming symptoms.  The patient has had gout previously but not located to this region of the body.  She has also had previous issues with this elbow which was formally diagnosed as tendinitis.  The patient has a past medical history of gastroparesis indicate that she has not eaten in the past several days she has had a limited appetite and has only had a protein shake.  Patient denies any trauma or injury to the area.  Nursing Notes were reviewed.    PAST MEDICAL HISTORY     Past Medical History:   Diagnosis Date    Pain in right knee 11/09/2016    Right knee pain         SURGICAL HISTORY       Past Surgical History:   Procedure Laterality Date    MASTECTOMY  01/12/2018    Breast Surgery Mastectomy    OTHER SURGICAL HISTORY  01/12/2018    Open Treatment Of Bicondylar Fracture Of The Tibial Plateau         CURRENT MEDICATIONS       Previous Medications    LORAZEPAM (ATIVAN) 1 MG TABLET    Take 1 tablet (1 mg) by mouth once daily at bedtime for 10 days.    ROPINIROLE (REQUIP) 2 MG TABLET    Take 1 tablet (2 mg) by mouth once daily at bedtime.       ALLERGIES     Sulfa (sulfonamide antibiotics), Metronidazole, Adhesive tape-silicones, Allopurinol, Cephalosporins, Pineapple, and Salicylates    FAMILY HISTORY     No family history on file.       SOCIAL HISTORY       Social History     Socioeconomic History    Marital status:       Spouse name: Not on file    Number of children: Not on file    Years of education: Not on file    Highest education level: Not on file   Occupational History    Not on file   Tobacco Use    Smoking status: Every Day     Current packs/day: 1.00     Average packs/day: 1 pack/day for 50.0 years (50.0 ttl pk-yrs)     Types: Cigarettes    Smokeless tobacco: Former   Substance and Sexual Activity    Alcohol use: Yes     Comment: occasional    Drug use: Yes     Types: Marijuana     Comment: medical  /per patient statement    Sexual activity: Not on file   Other Topics Concern    Not on file   Social History Narrative    Not on file     Social Determinants of Health     Financial Resource Strain: Not on file   Food Insecurity: Not on file   Transportation Needs: Not on file   Physical Activity: Not on file   Stress: Not on file   Social Connections: Not on file   Intimate Partner Violence: Not on file   Housing Stability: Not on file       SCREENINGS                        PHYSICAL EXAM    (up to 7 for level 4, 8 or more for level 5)     ED Triage Vitals [06/25/24 1214]   Temperature Heart Rate Respirations BP   36.4 °C (97.5 °F) 90 19 135/86      Pulse Ox Temp Source Heart Rate Source Patient Position   95 % Temporal Monitor Sitting      BP Location FiO2 (%)     Right arm --       Physical Exam  Constitutional:       Appearance: She is well-developed.   HENT:      Head: Normocephalic and atraumatic.      Right Ear: Tympanic membrane normal.      Left Ear: Tympanic membrane normal.      Nose: Nose normal.      Mouth/Throat:      Mouth: Mucous membranes are moist.      Pharynx: Oropharynx is clear.   Eyes:      Extraocular Movements: Extraocular movements intact.      Pupils: Pupils are equal, round, and reactive to light.   Cardiovascular:      Rate and Rhythm: Normal rate and regular rhythm.      Pulses: Normal pulses.      Heart sounds: Normal heart sounds.   Pulmonary:      Effort: Pulmonary effort is normal.      Breath  sounds: Normal breath sounds.   Abdominal:      General: Abdomen is flat.      Palpations: Abdomen is soft.   Musculoskeletal:      Right elbow: Swelling present. Decreased range of motion. Tenderness present.      Left elbow: Normal.      Cervical back: Normal range of motion and neck supple.      Comments: Patient endorses swelling and limited range of motion and strength to the right elbow.  Strength is limited on the right side as well pulses and sensation are equal in bilateral.   Skin:     General: Skin is warm.   Neurological:      General: No focal deficit present.      Mental Status: She is alert and oriented to person, place, and time.   Psychiatric:         Mood and Affect: Mood normal.         Behavior: Behavior normal.          DIAGNOSTIC RESULTS     LABS:  Labs Reviewed   CBC WITH AUTO DIFFERENTIAL   COMPREHENSIVE METABOLIC PANEL   URIC ACID       All other labs were within normal range or not returned as of this dictation.    Imaging  XR elbow right 3+ views    (Results Pending)        Procedures  Procedures     EMERGENCY DEPARTMENT COURSE/MDM:   Medical Decision Making  64-year-old female presents emergency department with chief complaint of elbow pain.  Medical management and treatment emergency department will consist of CBC, CMP, uric acid, three-view x-ray of the elbow.  Therapeutic management will be performed with 1 g of morphine.  Patient's x-ray and lab work are unrevealing.  The patient claims to still have continued pain despite dose of morphine.  Conversation was had with patient regards to discharge indicates that she feels comfortable with that she did however asked for something more for pain.  Will we will be providing limited dose of Percocet with strict return precautions.  Patient will be discharged home.        Diagnoses as of 06/25/24 1428   Right elbow pain        Patient and or family in agreement and understanding of treatment plan.  All questions answered.      I reviewed the  case with the attending ED physician. The attending ED physician agrees with the plan. Patient and/or patient´s representative was counseled regarding labs, imaging, likely diagnosis, and plan. All questions were answered.    ED Medications administered this visit:    Medications   morphine injection 1 mg (has no administration in time range)       New Prescriptions from this visit:    New Prescriptions    No medications on file       Follow-up:  No follow-up provider specified.      Final Impression: No diagnosis found.      (Please note that portions of this note were completed with a voice recognition program.  Efforts were made to edit the dictations but occasionally words are mis-transcribed.)     Graeme Castellano MD  Resident  06/25/24 [id number]

## 2024-06-25 NOTE — DISCHARGE INSTRUCTIONS
You are seen in the emergency department today for elbow pain.  Lab work and imaging showed no acute findings.  We have sent a limited supply of Percocet to your local pharmacy please take them when you are in pain.  Please be careful taking this medication as it may cause you to feel drowsy do not operate machinery and attempt to rest after taking the medication.  If you develop any new pain fever nausea or vomiting please return back to the emergency department soon as possible.

## 2024-06-27 ENCOUNTER — APPOINTMENT (OUTPATIENT)
Dept: PRIMARY CARE | Facility: CLINIC | Age: 64
End: 2024-06-27
Payer: COMMERCIAL

## 2024-06-27 VITALS
HEART RATE: 83 BPM | HEIGHT: 67 IN | BODY MASS INDEX: 23.39 KG/M2 | SYSTOLIC BLOOD PRESSURE: 118 MMHG | WEIGHT: 149 LBS | RESPIRATION RATE: 18 BRPM | OXYGEN SATURATION: 95 % | DIASTOLIC BLOOD PRESSURE: 72 MMHG

## 2024-06-27 DIAGNOSIS — I10 HYPERTENSION, UNSPECIFIED TYPE: ICD-10-CM

## 2024-06-27 DIAGNOSIS — Z00.00 ROUTINE GENERAL MEDICAL EXAMINATION AT HEALTH CARE FACILITY: ICD-10-CM

## 2024-06-27 DIAGNOSIS — I21.4 NSTEMI (NON-ST ELEVATED MYOCARDIAL INFARCTION) (MULTI): ICD-10-CM

## 2024-06-27 DIAGNOSIS — F41.9 ANXIETY: ICD-10-CM

## 2024-06-27 DIAGNOSIS — Z13.820 OSTEOPOROSIS SCREENING: ICD-10-CM

## 2024-06-27 DIAGNOSIS — Z90.13 S/P MASTECTOMY, BILATERAL: ICD-10-CM

## 2024-06-27 DIAGNOSIS — Z12.11 COLON CANCER SCREENING: ICD-10-CM

## 2024-06-27 DIAGNOSIS — J44.9 CHRONIC OBSTRUCTIVE PULMONARY DISEASE, UNSPECIFIED COPD TYPE (MULTI): ICD-10-CM

## 2024-06-27 DIAGNOSIS — Z23 ENCOUNTER FOR IMMUNIZATION: ICD-10-CM

## 2024-06-27 DIAGNOSIS — Z00.00 WELCOME TO MEDICARE PREVENTIVE VISIT: Primary | ICD-10-CM

## 2024-06-27 DIAGNOSIS — N95.9 UNSPECIFIED MENOPAUSAL AND PERIMENOPAUSAL DISORDER: ICD-10-CM

## 2024-06-27 DIAGNOSIS — G25.81 RESTLESS LEGS SYNDROME (RLS): ICD-10-CM

## 2024-06-27 DIAGNOSIS — M70.21 OLECRANON BURSITIS OF RIGHT ELBOW: ICD-10-CM

## 2024-06-27 DIAGNOSIS — Z13.6 SCREENING FOR CARDIOVASCULAR CONDITION: ICD-10-CM

## 2024-06-27 DIAGNOSIS — R73.9 HYPERGLYCEMIA, UNSPECIFIED: ICD-10-CM

## 2024-06-27 DIAGNOSIS — F10.20 ALCOHOLISM (MULTI): ICD-10-CM

## 2024-06-27 LAB
CHOLEST SERPL-MCNC: 227 MG/DL (ref 0–199)
CHOLESTEROL/HDL RATIO: 5.4
HDLC SERPL-MCNC: 42.4 MG/DL
LDLC SERPL CALC-MCNC: 156 MG/DL
NON HDL CHOLESTEROL: 185 MG/DL (ref 0–149)
TRIGL SERPL-MCNC: 143 MG/DL (ref 0–149)
VLDL: 29 MG/DL (ref 0–40)

## 2024-06-27 RX ORDER — METHYLPREDNISOLONE 4 MG/1
TABLET ORAL
Qty: 21 TABLET | Refills: 0 | Status: SHIPPED | OUTPATIENT
Start: 2024-06-27 | End: 2024-07-04

## 2024-06-27 ASSESSMENT — ENCOUNTER SYMPTOMS
DYSURIA: 0
NAUSEA: 0
DIARRHEA: 0
WEAKNESS: 0
SINUS PRESSURE: 0
CONSTIPATION: 0
FEVER: 0
HEADACHES: 0
ARTHRALGIAS: 1
WOUND: 0
MYALGIAS: 0
OCCASIONAL FEELINGS OF UNSTEADINESS: 0
CHILLS: 0
CHEST TIGHTNESS: 0
EYE PAIN: 0
SORE THROAT: 0
SINUS PAIN: 0
ABDOMINAL PAIN: 0
LIGHT-HEADEDNESS: 0
VOMITING: 0
NUMBNESS: 1
DECREASED CONCENTRATION: 0
DEPRESSION: 0
CONFUSION: 0
LOSS OF SENSATION IN FEET: 1
SHORTNESS OF BREATH: 0

## 2024-06-27 ASSESSMENT — ACTIVITIES OF DAILY LIVING (ADL)
DRESSING: INDEPENDENT
BATHING: INDEPENDENT
MANAGING_FINANCES: INDEPENDENT
GROCERY_SHOPPING: INDEPENDENT
TAKING_MEDICATION: INDEPENDENT
DOING_HOUSEWORK: INDEPENDENT

## 2024-06-27 ASSESSMENT — PATIENT HEALTH QUESTIONNAIRE - PHQ9
2. FEELING DOWN, DEPRESSED OR HOPELESS: NOT AT ALL
SUM OF ALL RESPONSES TO PHQ9 QUESTIONS 1 AND 2: 0
1. LITTLE INTEREST OR PLEASURE IN DOING THINGS: NOT AT ALL

## 2024-06-27 NOTE — PROGRESS NOTES
"Subjective   Reason for Visit: Kylie Duong is an 64 y.o. female here for a Medicare Wellness visit.          Reviewed all medications by prescribing practitioner or clinical pharmacist (such as prescriptions, OTCs, herbal therapies and supplements) and documented in the medical record.    HPI    Patient Care Team:  OKSANA Mccormick-CNP as PCP - General (Internal Medicine)     Review of Systems    Objective   Vitals:  /72   Pulse 83   Resp 18   Ht 1.702 m (5' 7\")   Wt 67.6 kg (149 lb)   SpO2 95%   BMI 23.34 kg/m²       Physical Exam    Assessment/Plan   Problem List Items Addressed This Visit    None         "

## 2024-06-27 NOTE — PROGRESS NOTES
"Subjective   Patient ID: Kylie Duong is a 64 y.o. female.    Patient presents today for annual Medicare wellness exam.    She states that she eats a well-balanced diet and drinks plenty of water throughout the day.  She walks for exercise.    She states that she continues to smoke 1 pack of cigarettes per day and does not desire to quit at this time. She has been a pack a day smoker for the past 50 years. She says that she drinks alcohol occasionally and does not use recreational drugs. Spent appoximately 8 minutes discussing options for tobacco cessation options, but she is not interested in any of them at this time.    Ready to quit: No  Counseling given: Yes    Tobacco Use: High Risk (7/10/2024)   Patient History   · Smoking Tobacco Use: Every Day   · Smokeless Tobacco Use: Former   · Passive Exposure: Not on file    Patient states she developed some swelling in her right elbow a few days ago and finally went to the the ER due to increased pain.  She has an appointment with orthopedics scheduled for Monday.      Unknown last colonoscopy.        Review of Systems   Constitutional:  Negative for chills and fever.   HENT:  Negative for congestion, sinus pressure, sinus pain and sore throat.    Eyes:  Negative for pain.   Respiratory:  Negative for chest tightness and shortness of breath.    Cardiovascular:  Negative for chest pain.   Gastrointestinal:  Negative for abdominal pain, constipation, diarrhea, nausea and vomiting.   Genitourinary:  Negative for dysuria.   Musculoskeletal:  Positive for arthralgias (right elbow). Negative for myalgias.   Skin:  Negative for rash and wound.   Neurological:  Positive for numbness (fingers). Negative for weakness, light-headedness and headaches.   Psychiatric/Behavioral:  Negative for confusion and decreased concentration.        Objective   /72   Pulse 83   Resp 18   Ht 1.702 m (5' 7\")   Wt 67.6 kg (149 lb)   SpO2 95%   BMI 23.34 kg/m²     Physical " Exam  Constitutional:       Appearance: Normal appearance.   HENT:      Head: Normocephalic and atraumatic.      Right Ear: Tympanic membrane, ear canal and external ear normal.      Left Ear: Tympanic membrane, ear canal and external ear normal.      Nose: Nose normal.      Mouth/Throat:      Mouth: Mucous membranes are moist.   Eyes:      Conjunctiva/sclera: Conjunctivae normal.      Pupils: Pupils are equal, round, and reactive to light.   Cardiovascular:      Rate and Rhythm: Normal rate and regular rhythm.      Heart sounds: Normal heart sounds. No murmur heard.     No friction rub.   Pulmonary:      Effort: Pulmonary effort is normal. No respiratory distress.      Breath sounds: Decreased air movement present. Decreased breath sounds present.   Abdominal:      General: Bowel sounds are normal.      Palpations: Abdomen is soft.   Musculoskeletal:      Right elbow: Swelling present. Decreased range of motion. Tenderness present.      Cervical back: Normal range of motion.   Skin:     General: Skin is warm and dry.      Coloration: Skin is cyanotic (nose and lips).   Neurological:      General: No focal deficit present.      Mental Status: She is alert and oriented to person, place, and time.   Psychiatric:         Mood and Affect: Mood normal.         Behavior: Behavior normal.         Thought Content: Thought content normal.         Judgment: Judgment normal.       Assessment/Plan   Problem List Items Addressed This Visit       Anxiety    Hypertension    Relevant Orders    Hemoglobin A1C (Completed)    Lipid Panel (Completed)    3 Month Follow Up In [facility] - PCP    NSTEMI (non-ST elevated myocardial infarction) (Multi)    Restless legs syndrome (RLS)    S/P mastectomy, bilateral    Chronic obstructive pulmonary disease (Multi)    Relevant Orders    Hemoglobin A1C (Completed)    Alcoholism (Multi)     Other Visit Diagnoses       Colon cancer screening    -  Primary    Relevant Orders     Colonoscopy Screening; Average Risk Patient    Osteoporosis screening        Relevant Orders    XR DEXA bone density (Completed)    Welcome to Medicare preventive visit        Olecranon bursitis of right elbow        Unspecified menopausal and perimenopausal disorder        Relevant Orders    XR DEXA bone density (Completed)    Hyperglycemia, unspecified        Relevant Orders    Hemoglobin A1C (Completed)    Encounter for immunization        Relevant Orders    Pneumococcal conjugate vaccine, 20-valent (PREVNAR 20) (Completed)    Screening for cardiovascular condition        Relevant Orders    ECG 12 lead (Completed)    Routine general medical examination at health care facility              Patient Instructions   Patient to continue medications as ordered. Have fasting labs drawn, and we will call with results when available. Follow-up in 1 year, or sooner if needed. Call the office if any problems or concerns in the meantime.

## 2024-06-28 LAB
EST. AVERAGE GLUCOSE BLD GHB EST-MCNC: 97 MG/DL
HBA1C MFR BLD: 5 %

## 2024-07-01 ENCOUNTER — HOSPITAL ENCOUNTER (OUTPATIENT)
Dept: RADIOLOGY | Facility: CLINIC | Age: 64
Discharge: HOME | End: 2024-07-01
Payer: MEDICARE

## 2024-07-01 ENCOUNTER — OFFICE VISIT (OUTPATIENT)
Dept: ORTHOPEDIC SURGERY | Facility: CLINIC | Age: 64
End: 2024-07-01
Payer: MEDICARE

## 2024-07-01 VITALS — BODY MASS INDEX: 22.76 KG/M2 | WEIGHT: 145 LBS | HEIGHT: 67 IN

## 2024-07-01 DIAGNOSIS — S46.311A RUPTURE OF RIGHT TRICEPS TENDON, INITIAL ENCOUNTER: ICD-10-CM

## 2024-07-01 DIAGNOSIS — S46.311A RUPTURE OF RIGHT TRICEPS TENDON, INITIAL ENCOUNTER: Primary | ICD-10-CM

## 2024-07-01 PROCEDURE — 1160F RVW MEDS BY RX/DR IN RCRD: CPT | Performed by: PHYSICIAN ASSISTANT

## 2024-07-01 PROCEDURE — 99214 OFFICE O/P EST MOD 30 MIN: CPT | Performed by: PHYSICIAN ASSISTANT

## 2024-07-01 PROCEDURE — 1123F ACP DISCUSS/DSCN MKR DOCD: CPT | Performed by: PHYSICIAN ASSISTANT

## 2024-07-01 PROCEDURE — 1125F AMNT PAIN NOTED PAIN PRSNT: CPT | Performed by: PHYSICIAN ASSISTANT

## 2024-07-01 PROCEDURE — 4004F PT TOBACCO SCREEN RCVD TLK: CPT | Performed by: PHYSICIAN ASSISTANT

## 2024-07-01 PROCEDURE — 1159F MED LIST DOCD IN RCRD: CPT | Performed by: PHYSICIAN ASSISTANT

## 2024-07-01 PROCEDURE — 73221 MRI JOINT UPR EXTREM W/O DYE: CPT | Mod: RT

## 2024-07-01 ASSESSMENT — LIFESTYLE VARIABLES
3_OR_MORE_DRINKS_PER_DAY: N
CURRENT_SMOKER: Y

## 2024-07-01 ASSESSMENT — PAIN SCALES - GENERAL: PAINLEVEL_OUTOF10: 3

## 2024-07-01 ASSESSMENT — PAIN - FUNCTIONAL ASSESSMENT: PAIN_FUNCTIONAL_ASSESSMENT: 0-10

## 2024-07-01 NOTE — PROGRESS NOTES
Subjective    Patient ID: Kylie Duong is a 64 y.o. female.    Chief Complaint: Pain of the Right Elbow           HPI:  Kylie Duong is a 64 y.o. female who presents today for complaint of right elbow pain.  She states that for little over a week she has been experiencing sharp pain, swelling, and weakness in her right elbow.  No specific injury or trauma that she could recall.  She states that her elbow was significantly tender even to light touch.  She went to the emergency room on 6/25/2024 and then followed up with her primary care provider on 6/27/2024.  She states that she was started on an oral steroid pack at that time and since then has noted some improvement in her symptoms.  She feels that swelling has improved slightly.  She states that when her elbow was swollen it was mainly in the posterior aspect.  She denies having had any redness or significant warmth in this area.  She is a daily smoker.    ROS  Constitutional: No fever, no chills, not feeling tired, no recent weight gain and no recent weight loss  ENT: No nosebleeds  Cardiovascular: No chest pain  Respiratory: No shortness of breath and no cough  Gastrointestinal: No abdominal pain, no nausea, no diarrhea, and no vomiting  Musculoskeletal: No arthralgias  Integumentary: No rashes and no skin lesions  Neurological: No headache  Psychiatric: No sleep disturbances no depression  Endocrine: No muscle weakness and no muscle cramps  Hematologic/lymphatic: No swelling glands and no tendency for easy bruising    Past Medical History:   Diagnosis Date    Pain in right knee 11/09/2016    Right knee pain        Past Surgical History:   Procedure Laterality Date    MASTECTOMY  01/12/2018    Breast Surgery Mastectomy    OTHER SURGICAL HISTORY  01/12/2018    Open Treatment Of Bicondylar Fracture Of The Tibial Plateau          Current Outpatient Medications:     LORazepam (Ativan) 1 mg tablet, Take 1 tablet (1 mg) by mouth once daily at bedtime for 10 days., Disp:  10 tablet, Rfl: 0    methylPREDNISolone (Medrol Dospak) 4 mg tablets, Take as directed on package., Disp: 21 tablet, Rfl: 0    rOPINIRole (Requip) 2 mg tablet, Take 1 tablet (2 mg) by mouth once daily at bedtime., Disp: 90 tablet, Rfl: 0     Allergies   Allergen Reactions    Sulfa (Sulfonamide Antibiotics) Hives    Metronidazole Itching and Rash    Adhesive Tape-Silicones Hives     Stery strips  Reaction-blisters     Allopurinol Hives    Cephalosporins GI Upset    Pineapple Other    Salicylates GI Upset        Social Connections: Not on file          Objective   64-year-old female well appearing in no acute distress. Alert and oriented ×3.  Skin intact bilateral upper extremities.   Normal tandem gait. Coordination and balance intact.  Bilateral upper extremity compartments supple.  5 out of 5 distal motor strength bilaterally.  C2 through C7 sensation intact bilaterally.  2+ distal pulses bilaterally.  She lacks about 3 degrees of full extension on the right when compared to the left.  She has full flexion bilaterally.  Equivalent pronation and supination bilaterally.  She is tender palpation over the distal triceps tendon.  She has 2 out of 5 strength with elbow extension on the right when compared to the left.  No significant swelling over the olecranon.    Image Results:  X-rays of the right elbow dated 6/25/2024 reviewed today.  These negative for fracture or dislocation.      Assessment/Plan   Encounter Diagnoses:  Rupture of right triceps tendon, initial encounter    Orders Placed This Encounter    MR elbow right wo IV contrast       I am concerned about the significant weakness she presents with on exam.  I am worried that she may have injured her triceps tendon at some point.  I recommend an MRI to evaluate this further.  She is going to continue with her oral prednisone.  After the MRI has been completed she will call the office and we will review the results with her by phone.    Addendum: Kylie was able to  obtain the MRI and her right elbow today.  The MRI showed partial-thickness tearing of her distal triceps tendon but thankfully the majority of the tendon is intact.  She does have a little bit of fluid within the bursal region.  She is going to finish the prednisone that she is currently on.  Once this has been completed she can start applying topical diclofenac to the area of soreness 3-4 times a day for about 2 weeks.  She is also referred for physical therapy where they can begin to work with her on regaining her strength.  She will monitor for any increased swelling or redness and let us know immediately should this occur.  Otherwise we will see her back as needed.    This office note was dictated using Dragon voice to text software and was not proofread for spelling or grammatical errors

## 2024-07-03 ENCOUNTER — HOSPITAL ENCOUNTER (OUTPATIENT)
Dept: RADIOLOGY | Facility: CLINIC | Age: 64
Discharge: HOME | End: 2024-07-03
Payer: MEDICARE

## 2024-07-03 DIAGNOSIS — N95.9 UNSPECIFIED MENOPAUSAL AND PERIMENOPAUSAL DISORDER: ICD-10-CM

## 2024-07-03 DIAGNOSIS — Z13.820 OSTEOPOROSIS SCREENING: ICD-10-CM

## 2024-07-03 PROCEDURE — 77080 DXA BONE DENSITY AXIAL: CPT | Performed by: RADIOLOGY

## 2024-07-03 PROCEDURE — 77080 DXA BONE DENSITY AXIAL: CPT

## 2024-07-05 ENCOUNTER — TELEPHONE (OUTPATIENT)
Dept: SCHEDULING | Age: 64
End: 2024-07-05
Payer: MEDICARE

## 2024-07-05 NOTE — TELEPHONE ENCOUNTER
Patient stated she started sneezing a lot yesterday.  She also had sinus drainage along with a headache.    She is unsure if it is sinuitis or allergies.    She would like to know best options for over the counter to help with the symptoms.

## 2024-07-10 ASSESSMENT — PATIENT HEALTH QUESTIONNAIRE - PHQ9
1. LITTLE INTEREST OR PLEASURE IN DOING THINGS: NOT AT ALL
SUM OF ALL RESPONSES TO PHQ9 QUESTIONS 1 AND 2: 0
2. FEELING DOWN, DEPRESSED OR HOPELESS: NOT AT ALL

## 2024-07-10 ASSESSMENT — ACTIVITIES OF DAILY LIVING (ADL)
MANAGING_FINANCES: INDEPENDENT
DOING_HOUSEWORK: INDEPENDENT
DRESSING: INDEPENDENT
GROCERY_SHOPPING: INDEPENDENT
TAKING_MEDICATION: INDEPENDENT
BATHING: INDEPENDENT

## 2024-07-10 NOTE — PATIENT INSTRUCTIONS
Patient to continue medications as ordered. Have fasting labs drawn, and we will call with results when available. Follow-up in 3 months, or sooner if needed. Call the office if any problems or concerns in the meantime.

## 2024-07-12 ENCOUNTER — TELEPHONE (OUTPATIENT)
Dept: PRIMARY CARE | Facility: CLINIC | Age: 64
End: 2024-07-12
Payer: MEDICARE

## 2024-07-12 DIAGNOSIS — E55.9 VITAMIN D DEFICIENCY: Primary | ICD-10-CM

## 2024-07-12 RX ORDER — ERGOCALCIFEROL 1.25 MG/1
50000 CAPSULE ORAL
Qty: 12 CAPSULE | Refills: 0 | Status: SHIPPED | OUTPATIENT
Start: 2024-07-14 | End: 2024-10-06

## 2024-07-12 NOTE — TELEPHONE ENCOUNTER
[facility] and was told an Rx was being sent for Vitamin D.  She went to the pharmacy to pick it up and it wasn't there.

## 2024-07-19 ENCOUNTER — HOSPITAL ENCOUNTER (OUTPATIENT)
Dept: RADIOLOGY | Facility: CLINIC | Age: 64
Discharge: HOME | End: 2024-07-19
Payer: MEDICARE

## 2024-07-19 DIAGNOSIS — K74.69 OTHER CIRRHOSIS OF LIVER (MULTI): ICD-10-CM

## 2024-07-19 PROCEDURE — 76705 ECHO EXAM OF ABDOMEN: CPT

## 2024-08-01 ENCOUNTER — EVALUATION (OUTPATIENT)
Dept: PHYSICAL THERAPY | Facility: CLINIC | Age: 64
End: 2024-08-01
Payer: MEDICARE

## 2024-08-01 DIAGNOSIS — S46.311A RUPTURE OF RIGHT TRICEPS TENDON, INITIAL ENCOUNTER: ICD-10-CM

## 2024-08-01 PROCEDURE — 97161 PT EVAL LOW COMPLEX 20 MIN: CPT | Mod: GP

## 2024-08-01 PROCEDURE — 97110 THERAPEUTIC EXERCISES: CPT | Mod: GP

## 2024-08-01 ASSESSMENT — PAIN SCALES - GENERAL: PAINLEVEL_OUTOF10: 6

## 2024-08-01 ASSESSMENT — PAIN - FUNCTIONAL ASSESSMENT: PAIN_FUNCTIONAL_ASSESSMENT: 0-10

## 2024-08-01 NOTE — PROGRESS NOTES
"Physical Therapy    Physical Therapy Evaluation    Patient Name: Kylie Duong  MRN: [id number]  Today's Date: 2024   confirmed verbally by patient.    Reason for Visit  Referred by:      Phillip Dickinson PA-C     Referral DX:  Rupture of right triceps tendon, initial encounter [S44.311A]     Insurance:  Visit: #1  Authorized: to be determined  Certification Period: 2024 to   [facility], [place] 30.00 COPAY, 100% COVERAGE, MED NEC, NO AUTH REQ, RTE 24  Rupture of right triceps tendon, initial encounter [N12.194A]     Current Problem  1. Rupture of right triceps tendon, initial encounter  Referral to Physical Therapy          Subjective   Date of Onset: 2024  Chief Complaint: Right elbow pain  Hand Dominance: Right Handed    Patient is a 64-year-old female presenting to physical therapy with reports of elbow pain, following injury to right triceps tendon. Patient received MRI on  which revealed small partial thickness tearing of the distal triceps, but majority of fibers are intact. She reports injury occurred randomly and she woke up with a ton a pain. She does walk her dog often though and thinks it could be related. Does have a history of lateral epicondylitis bilaterally.     Functionally, patient reports difficulty with \"any activity that requires her to push with her arms\", such as unbuckling her car seat, pushing up from getting out of bed, and using that hand to cut foods. Prior to injury patient reports no difficulty with above listed activities. Patient remains independent with ADLs and IADLs though limited by pain. Patient recreationally enjoys walking her dog as much as possible.   Unbuckling seat belt, tear.      Patient reports she was on prednisone which was good when she was in the middle in the dosage and now she is having a lot of pain again. Currently, not taking anything for pain relief because she has a sensitive stomach.     RECENT IMAGING:  MRI IMPRESSION 2024:  \"Small amount " "of partial-thickness tearing of the distal triceps with  adjacent bursal fluid and inflammatory change. Superinfection not  excluded. Correlate with any signs and symptoms of possible  infection. Majority of the triceps fibers are intact. There is also  triceps tendinosis and some enthesis insertional edema of the  olecranon. Mild medial and lateral epicondylitis.\"    RECENT ORTHO VISIT:   \"She is also referred for physical therapy where they can begin to work with her on regaining her strength.  She will monitor for any increased swelling or redness and let us know immediately should this occur.  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 Patient not currently in active cancer treatment. Patient otherwise healthy as of today's date, health is being well managed by appropriate providers. Likely to have health factors that will affect healing times for repairing tissue.     Barriers Impacting Care:  None.    Precautions:  Precautions  STEADI Fall Risk Score (The score of 4 or more indicates an increased risk of falling): 0  Precautions Comment: hx of cancer, osteoporosis, stomach ulcers, liver disease, cervical neuropathy, latearl epicondylitis, and active smoker    Pain:  Pain Assessment: 0-10  0-10 (Numeric) Pain Score: 6  Pain Description: Sharp/aching pain at tip of elbow, olecranon.       Objective   Range of Motion:   Elbow (right)  0- 150 degrees pain-free  Pronation: WNL pain-free  Supination: WNL pain-free    Wrist (right)   Flexion: WNL pain-free  Extension: WNL pain-free    Elbow (left)  0 -135 with pain at end-range flexion and extension   Pronation: WNL pain-free  Supination: WNL pain-free    Wrist (right)   Flexion: WNL pain-free  Extension: WNL pain-free     Strength: "   Elbow (right)  Flexion 4+/5   Extension  4+/5  Pronation 4+/5  Supination  4+/5    Elbow (left)  Flexion 4/5, slight pain posterior elbow   Extension deferred max resisted testing  Pronation 4+/5  Supination 4/5    Palpation:   Noted filled bursae right elbow above olecranon process.   Pain with palpation of distal and middle triceps.   No redness, warmth, or drainage noted.      Neurological Screen:   UE Myotomes: 5/5  UE Reflexes: 2+ normal bilaterally, no testing of right triceps reflex C7  Light touch sensation intact bilaterally     Girth Measurement:   Left Joint Line Elbow 22cm   Right Joint Line Elbow 25cm     TREATMENT  Educated patient on course of treatment.     THERAPEUTIC EXERCISE:   Introduced:  Isometric triceps with forearm into table; 3x10 reps, 1x/day 40-50% effort max. Also pain-free and to tolerance.   Supination with red band; 20 reps, 1x/day.  Tolerated well, no increase in pain.    KT tape applied running parallel to triceps for swelling and pain relief. Patient understands how to remove tape with warm water and soap. Patient verbalizes consent to KT tape and reports no allergy. Skin healthy and intact for application.    OP Education: HEP: Patient verbalizes and demonstrates understanding of home exercises. She will contact writer if with questions or if condition worsens. Discussed use of ice massage onto the area instead of large ice pack. Discussed importance of remaining careful with pushing/pulling activities. T  HEP:  - Isometric triceps with forearm into table; 3x10 reps, 1x/day 40-50% effort max. Also pain-free and to tolerance.   - Supination with red band; 20 reps, 1x/day.    Charges: [id number], [id number]    Outcome Measures:  Other Measures  Other Outcome Measures: Quick DASH: 18.18%     Assessment  PT Diagnosis: Patient presents with pain, weakness, decreased mobility, and function secondary to signs/symptoms consistent with referral diagnosis of partial tear of triceps tendon and  bursitis of right elbow.   Skilled PT required to return to prior level of function and maximize functional outcome. Likely to benefit from skilled care to improve outcome.     Plan  Treatment/Interventions: Aquatic therapy, Biofeedback, Blood flow restriction therapy, Cryotherapy, Dry needling, Education/ Instruction, Electrical stimulation, Hot pack, Manual therapy, Neuromuscular re-education, Self care/ home management, Taping techniques, Therapeutic activities, Therapeutic exercises  PT Plan: Skilled PT  PT Frequency: 1 time per week  Duration: 12 weeks  Certification Period Start Date: 08/01/24  Certification Period End Date: 10/30/24  Rehab Potential: Good  Plan of Care Agreement: Patient    Next Visit: Progress as appropriate and tolerated.     Goals:  Patient will report pain-free unbuckling of her seat belt in 8 weeks.   Patient will demonstrate ability to push up equally with both arms from chair utilizing triceps in 12 weeks.   Patient will report outcome measure score of <5% indicating return to prior level of function in 12 weeks.   Patient demonstrate AROM and strength of right elbow to that of the left in 12 weeks.   Patient will be independent with HEP.    Roz Madison PT, DPT

## 2024-08-14 ENCOUNTER — TELEPHONE (OUTPATIENT)
Dept: PRIMARY CARE | Facility: CLINIC | Age: 64
End: 2024-08-14
Payer: MEDICARE

## 2024-08-14 NOTE — TELEPHONE ENCOUNTER
Patient is having extreme problems with her neighbors.  It has been the worst since Saturday.     Symptoms when she encounters the neighbors, goes out to walk her dog.  Shaking uncontrollably in the legs and backside, it was difficult to inhale.  It felt like a major panic attack.  She thought she was going to have to call an ambulance.    Whenever she thinks about the problem with the neighbors the symptoms start up again.      Patient needs support.  She wants to know if anxiety can turn into a heart attack.    What can be done to help relieve the stress levels.

## 2024-08-14 NOTE — TELEPHONE ENCOUNTER
I called pt and spoke to her about anxiety. I also let her know that I would speak to Ferny tomorrow. Pt said that her dog has diarrhea from pt's anxiety. Pt stated that she is looking for another apartment to get away from her neighbors.

## 2024-08-15 ENCOUNTER — TELEPHONE (OUTPATIENT)
Dept: PRIMARY CARE | Facility: CLINIC | Age: 64
End: 2024-08-15
Payer: MEDICARE

## 2024-08-15 ENCOUNTER — TELEPHONE (OUTPATIENT)
Dept: PRIMARY CARE | Facility: CLINIC | Age: 64
End: 2024-08-15

## 2024-08-15 NOTE — TELEPHONE ENCOUNTER
I called pt and left voicemail for pt that she should schedule a follow up appt with Ferny. I will have the counseling resources pages scanned and attached into her chart, if she wants to call and make an appointment for counseling.

## 2024-08-15 NOTE — TELEPHONE ENCOUNTER
I called pt and left voicemail that I will call back later today, and pt needs to schedule appointment. I also have counseling resources for pt.

## 2024-08-20 NOTE — TELEPHONE ENCOUNTER
Patient  left message  on the refill line asking  for a refill on lorazepam to be sent to  pharmacy in [place], per chart  last clifton with you was 6.27.24. the last RX  you gave her for 10 pills ,no refills was  on 5.28.24 .  Thank you !

## 2024-08-22 DIAGNOSIS — F41.9 ANXIETY: ICD-10-CM

## 2024-08-22 RX ORDER — LORAZEPAM 1 MG/1
1 TABLET ORAL NIGHTLY
Qty: 10 TABLET | Refills: 0 | Status: SHIPPED | OUTPATIENT
Start: 2024-08-22 | End: 2024-09-01

## 2024-08-22 NOTE — TELEPHONE ENCOUNTER
I called pt and left vm that she needs to make an appt for her drug screen and csa. This is the last time Ferny will refill narcotic.

## 2024-11-18 ENCOUNTER — TELEPHONE (OUTPATIENT)
Dept: PRIMARY CARE | Facility: CLINIC | Age: 64
End: 2024-11-18
Payer: MEDICARE

## 2024-11-18 NOTE — TELEPHONE ENCOUNTER
Patient stated she is having troubles with memory, anger, depression and paranoia.    She is wondering if it is related to the couple of falls she took and hitting her head.    She would like to get a head CT to see if she had a concussion and if it is all somehow related.    An appointment has been scheduled for December 2nd but if the CT could be done sooner that would be helpful.

## 2024-11-19 ENCOUNTER — OFFICE VISIT (OUTPATIENT)
Dept: PRIMARY CARE | Facility: CLINIC | Age: 64
End: 2024-11-19
Payer: MEDICARE

## 2024-11-19 ENCOUNTER — TELEPHONE (OUTPATIENT)
Dept: PRIMARY CARE | Facility: CLINIC | Age: 64
End: 2024-11-19

## 2024-11-19 ENCOUNTER — HOSPITAL ENCOUNTER (OUTPATIENT)
Dept: RADIOLOGY | Facility: CLINIC | Age: 64
Discharge: HOME | End: 2024-11-19
Payer: MEDICARE

## 2024-11-19 VITALS
SYSTOLIC BLOOD PRESSURE: 116 MMHG | DIASTOLIC BLOOD PRESSURE: 64 MMHG | RESPIRATION RATE: 18 BRPM | WEIGHT: 143 LBS | TEMPERATURE: 96.9 F | OXYGEN SATURATION: 96 % | BODY MASS INDEX: 22.4 KG/M2 | HEART RATE: 91 BPM

## 2024-11-19 DIAGNOSIS — F39 UNSPECIFIED MOOD (AFFECTIVE) DISORDER (CMS-HCC): ICD-10-CM
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
## 2024-11-19 DIAGNOSIS — S09.90XA INJURIES TO THE HEAD, INITIAL ENCOUNTER: ICD-10-CM

## 2024-11-19 DIAGNOSIS — F68.8 PERSONALITY CHANGE IN ADULT: ICD-10-CM

## 2024-11-19 DIAGNOSIS — S09.90XA INJURIES TO THE HEAD, INITIAL ENCOUNTER: Primary | ICD-10-CM

## 2024-11-19 PROCEDURE — 4004F PT TOBACCO SCREEN RCVD TLK: CPT | Performed by: NURSE PRACTITIONER

## 2024-11-19 PROCEDURE — 99214 OFFICE O/P EST MOD 30 MIN: CPT | Performed by: NURSE PRACTITIONER

## 2024-11-19 PROCEDURE — 3074F SYST BP LT 130 MM HG: CPT | Performed by: NURSE PRACTITIONER

## 2024-11-19 PROCEDURE — 1123F ACP DISCUSS/DSCN MKR DOCD: CPT | Performed by: NURSE PRACTITIONER

## 2024-11-19 PROCEDURE — 1160F RVW MEDS BY RX/DR IN RCRD: CPT | Performed by: NURSE PRACTITIONER

## 2024-11-19 PROCEDURE — 70450 CT HEAD/BRAIN W/O DYE: CPT

## 2024-11-19 PROCEDURE — 1159F MED LIST DOCD IN RCRD: CPT | Performed by: NURSE PRACTITIONER

## 2024-11-19 PROCEDURE — 3078F DIAST BP <80 MM HG: CPT | Performed by: NURSE PRACTITIONER

## 2024-11-19 PROCEDURE — 70450 CT HEAD/BRAIN W/O DYE: CPT | Performed by: RADIOLOGY

## 2024-11-19 ASSESSMENT — ENCOUNTER SYMPTOMS
GASTROINTESTINAL NEGATIVE: 1
CONFUSION: 1
NERVOUS/ANXIOUS: 1
DIZZINESS: 1
SLEEP DISTURBANCE: 1
CHILLS: 0
RESPIRATORY NEGATIVE: 1
FATIGUE: 0
AGITATION: 1
CARDIOVASCULAR NEGATIVE: 1
APPETITE CHANGE: 0
FEVER: 0

## 2024-11-19 NOTE — PROGRESS NOTES
"Subjective   Patient ID: Kylie Duong is a 64 y.o. female who presents for Fall.    PT reports a fall \"sometime over the summer\" that was the result of her dog knocking her down. Head hit the concrete - did not go to ER. Reports ongoing confusion and mood swings since fall.    Patient says that winter of 2022, patient believes that she was walking her dog and fell on the back of her head, hard, hitting the concrete. Pt says that she lost her hearing for a few minutes. She never went to the ER. Spring of this year, patient fell again, outside of her apartment complex. She says that her legs felt weak and it was hard for her to get herself into a standing position. As she was trying to get up, patient kept falling forward. It was hard for her to stand. Pt feels dizzy. Patient has a history of fractures and falls.     Patient says that for the past couple of months, she has been feeling very angry and paranoid. She feels anxious.    Patient says that she feels like she lacks depth perception. She says that she plans her routes so that she doesn't have to make a left turn when she drives.     Patient does admit to alcohol use daily; unclear about how much she drinks daily. She says that she drinks orange flavored vodka. She also endorses the use of medical marijuana.          Review of Systems   Constitutional:  Negative for appetite change, chills, fatigue and fever.   HENT: Negative.     Respiratory: Negative.     Cardiovascular: Negative.    Gastrointestinal: Negative.    Neurological:  Positive for dizziness.   Psychiatric/Behavioral:  Positive for agitation, confusion and sleep disturbance (staying asleep). Negative for self-injury and suicidal ideas. The patient is nervous/anxious.      Objective   /64   Pulse 91   Temp 36.1 °C (96.9 °F)   Resp 18   Wt 64.9 kg (143 lb)   SpO2 96%   BMI 22.40 kg/m²     Physical Exam  Vitals reviewed.   Constitutional:       General: She is not in acute distress.     " Appearance: She is not toxic-appearing.   HENT:      Head: Atraumatic.   Eyes:      Extraocular Movements: Extraocular movements intact.      Conjunctiva/sclera: Conjunctivae normal.      Pupils: Pupils are equal, round, and reactive to light.   Cardiovascular:      Rate and Rhythm: Normal rate and regular rhythm.      Heart sounds: Normal heart sounds. No murmur heard.  Pulmonary:      Effort: Pulmonary effort is normal.      Breath sounds: Wheezing present.   Skin:     General: Skin is warm and dry.   Neurological:      General: No focal deficit present.      Mental Status: She is alert.      Cranial Nerves: No cranial nerve deficit.   Psychiatric:         Mood and Affect: Mood normal.         Behavior: Behavior normal.     Assessment/Plan   Problem List Items Addressed This Visit    None  Visit Diagnoses         Codes    Injuries to the head, initial encounter    -  Primary S09.90XA    Relevant Orders    CT head wo IV contrast    Falls, initial encounter     W19.XXXA    Relevant Orders    CT head wo IV contrast    Referral to Neurology    Personality change in adult     F68.8    Relevant Orders    Vitamin B12    TSH with reflex to Free T4 if abnormal    CT head wo IV contrast    Referral to Neurology    Unspecified mood (affective) disorder (CMS-HCC)     F39    Relevant Orders    Vitamin B12    TSH with reflex to Free T4 if abnormal    CT head wo IV contrast    Referral to Neurology        Ordered CT of the head without IV contrast at this time. Pt also referred to neurology. Discussed that there are a lot of differential diagnoses for her symptoms. Will order TSH and B12 at this time. She has had other labs in the last 6 months. Advised pt that she needs to follow up with her PCP for further workup of her symptoms. She will see her PCP on 12/3/24. Discussed with patient that she is to go to the ER for any additional falls, weakness, or new/concerning symptoms; she agreed. Will call pt when results of CT imaging  become available.

## 2024-11-20 NOTE — TELEPHONE ENCOUNTER
Spoke to patient and relayed results of negative CT imaging. Pt to follow up with neurology and PCP as advised.

## 2024-11-22 DIAGNOSIS — G25.81 RESTLESS LEGS SYNDROME (RLS): Primary | ICD-10-CM

## 2024-11-22 RX ORDER — ROPINIROLE 2 MG/1
2 TABLET, FILM COATED ORAL NIGHTLY
Qty: 90 TABLET | Refills: 0 | Status: SHIPPED | OUTPATIENT
Start: 2024-11-22 | End: 2025-02-20

## 2024-12-03 ENCOUNTER — APPOINTMENT (OUTPATIENT)
Dept: PRIMARY CARE | Facility: CLINIC | Age: 64
End: 2024-12-03
Payer: MEDICARE

## 2024-12-03 VITALS
TEMPERATURE: 97.1 F | RESPIRATION RATE: 18 BRPM | OXYGEN SATURATION: 96 % | HEART RATE: 77 BPM | HEIGHT: 67 IN | BODY MASS INDEX: 23.64 KG/M2 | WEIGHT: 150.6 LBS | SYSTOLIC BLOOD PRESSURE: 118 MMHG | DIASTOLIC BLOOD PRESSURE: 68 MMHG
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
## 2024-12-03 DIAGNOSIS — F32.A DEPRESSION, UNSPECIFIED DEPRESSION TYPE: ICD-10-CM

## 2024-12-03 DIAGNOSIS — F31.9 BIPOLAR AFFECTIVE DISORDER, REMISSION STATUS UNSPECIFIED (MULTI): Primary | ICD-10-CM

## 2024-12-03 DIAGNOSIS — S09.90XA INJURIES TO THE HEAD, INITIAL ENCOUNTER: ICD-10-CM

## 2024-12-03 PROBLEM — R26.2 DIFFICULTY WALKING: Status: RESOLVED | Noted: 2024-01-23 | Resolved: 2024-12-03

## 2024-12-03 PROBLEM — S76.311A HAMSTRING STRAIN, RIGHT, INITIAL ENCOUNTER: Status: RESOLVED | Noted: 2024-01-23 | Resolved: 2024-12-03

## 2024-12-03 PROCEDURE — 1160F RVW MEDS BY RX/DR IN RCRD: CPT | Performed by: NURSE PRACTITIONER

## 2024-12-03 PROCEDURE — 1159F MED LIST DOCD IN RCRD: CPT | Performed by: NURSE PRACTITIONER

## 2024-12-03 PROCEDURE — 1123F ACP DISCUSS/DSCN MKR DOCD: CPT | Performed by: NURSE PRACTITIONER

## 2024-12-03 PROCEDURE — 99214 OFFICE O/P EST MOD 30 MIN: CPT | Performed by: NURSE PRACTITIONER

## 2024-12-03 PROCEDURE — 3008F BODY MASS INDEX DOCD: CPT | Performed by: NURSE PRACTITIONER

## 2024-12-03 PROCEDURE — 4004F PT TOBACCO SCREEN RCVD TLK: CPT | Performed by: NURSE PRACTITIONER

## 2024-12-03 RX ORDER — QUETIAPINE FUMARATE 25 MG/1
TABLET, FILM COATED ORAL
Qty: 42 TABLET | Refills: 0 | Status: SHIPPED | OUTPATIENT
Start: 2024-12-03 | End: 2024-12-31

## 2024-12-03 ASSESSMENT — PATIENT HEALTH QUESTIONNAIRE - PHQ9
SUM OF ALL RESPONSES TO PHQ9 QUESTIONS 1 AND 2: 0
2. FEELING DOWN, DEPRESSED OR HOPELESS: NOT AT ALL
1. LITTLE INTEREST OR PLEASURE IN DOING THINGS: NOT AT ALL

## 2024-12-03 NOTE — PROGRESS NOTES
Subjective   Kylie Duong is a 64 y.o. female who presents for Follow-up (Patient here today to address  mood swings, memory  concerns ).  Patient here today to address concerns regarding memory, anger, depression, moods swings,and anxiety. This started after she turned 64 in March, then suddenly disappeared within the last week.      Patient states she had 2 falls last year and both times she did hit the head on the concrete. She had 1 CT or the head done and was told everything was OK.    She was on effexor many years ago which didn't help at all. Paxil caused her hand tremors so she stopped it.       Review of Systems   Constitutional:  Negative for chills, fatigue and fever.   HENT:  Negative for rhinorrhea and sore throat.    Respiratory:  Negative for shortness of breath.    Cardiovascular:  Negative for chest pain.   Gastrointestinal:  Negative for constipation and diarrhea.   Genitourinary:  Negative for dysuria.   Musculoskeletal:  Positive for arthralgias and myalgias.   Neurological:  Negative for headaches.   Psychiatric/Behavioral:  The patient is nervous/anxious.        Objective   Physical Exam  Constitutional:       Appearance: Normal appearance.   Cardiovascular:      Rate and Rhythm: Normal rate and regular rhythm.      Pulses: Normal pulses.      Heart sounds: Normal heart sounds.   Pulmonary:      Effort: Pulmonary effort is normal.      Breath sounds: Normal breath sounds.   Abdominal:      General: Bowel sounds are normal.      Palpations: Abdomen is soft.   Musculoskeletal:         General: Normal range of motion.   Skin:     General: Skin is warm and dry.   Neurological:      Mental Status: She is alert.   Psychiatric:         Mood and Affect: Mood is anxious.         Speech: Speech is rapid and pressured.         Behavior: Behavior normal. Behavior is cooperative.         Thought Content: Thought content normal.         Cognition and Memory: Memory is impaired.       /68 (BP Location:  "Right arm, Patient Position: Sitting)   Pulse 77   Temp 36.2 °C (97.1 °F)   Resp 18   Ht 1.702 m (5' 7\")   Wt 68.3 kg (150 lb 9.6 oz)   SpO2 96%   BMI 23.59 kg/m²       Assessment/Plan   Problem List Items Addressed This Visit       Depression     Other Visit Diagnoses       Bipolar affective disorder, remission status unspecified (Multi)    -  Primary    Relevant Medications    QUEtiapine (SEROquel) 25 mg tablet    Falls, initial encounter        Injuries to the head, initial encounter              Patient Instructions   Patient agreed to start seroquel as ordered. Follow-up in 1 month, or sooner if needed. Call the office if any problems or concerns in the meantime.    More than 50% of the visit was spent counseling the patient. A total of more than 30 minutes was spent.      "

## 2024-12-26 ASSESSMENT — ENCOUNTER SYMPTOMS
FEVER: 0
MYALGIAS: 1
RHINORRHEA: 0
DYSURIA: 0
ARTHRALGIAS: 1
HEADACHES: 0
SHORTNESS OF BREATH: 0
NERVOUS/ANXIOUS: 1
CONSTIPATION: 0
SORE THROAT: 0
DIARRHEA: 0
FATIGUE: 0
CHILLS: 0

## 2024-12-26 NOTE — PATIENT INSTRUCTIONS
Patient agreed to start seroquel as ordered. Follow-up in 1 month, or sooner if needed. Call the office if any problems or concerns in the meantime.

## 2025-01-02 ENCOUNTER — TELEPHONE (OUTPATIENT)
Dept: PRIMARY CARE | Facility: CLINIC | Age: 65
End: 2025-01-02
Payer: MEDICARE

## 2025-01-02 DIAGNOSIS — F31.9 BIPOLAR AFFECTIVE DISORDER, REMISSION STATUS UNSPECIFIED (MULTI): ICD-10-CM

## 2025-01-02 RX ORDER — QUETIAPINE FUMARATE 50 MG/1
50 TABLET, FILM COATED ORAL NIGHTLY
Qty: 30 TABLET | Refills: 1 | Status: SHIPPED | OUTPATIENT
Start: 2025-01-02 | End: 2025-03-03

## 2025-01-02 NOTE — TELEPHONE ENCOUNTER
Patient is requesting refill for     QUEtiapine (SEROquel) 25 mg tablet        Sig: Take 1 tablet (25 mg) by mouth once daily at bedtime for 14 days, THEN 2 tablets (50 mg) once daily at bedtime for 14 days.        Sent to pharmacy as: QUEtiapine 25 mg tablet (SEROquel)        Class: Normal            Pharmacy Drug [facility]     *Patient is requesting a phone call when RX is sent to pharmacy .    Thank you !

## 2025-01-14 ENCOUNTER — APPOINTMENT (OUTPATIENT)
Dept: PRIMARY CARE | Facility: CLINIC | Age: 65
End: 2025-01-14
Payer: MEDICARE

## 2025-01-14 DIAGNOSIS — K59.00 CONSTIPATION, UNSPECIFIED CONSTIPATION TYPE: ICD-10-CM

## 2025-01-14 DIAGNOSIS — C50.219 MALIGNANT NEOPLASM OF UPPER-INNER QUADRANT OF FEMALE BREAST, UNSPECIFIED ESTROGEN RECEPTOR STATUS, UNSPECIFIED LATERALITY: ICD-10-CM

## 2025-01-14 DIAGNOSIS — F31.9 BIPOLAR AFFECTIVE DISORDER, REMISSION STATUS UNSPECIFIED (MULTI): ICD-10-CM

## 2025-01-14 DIAGNOSIS — D17.1 LIPOMA OF BUTTOCK: Primary | ICD-10-CM

## 2025-01-14 DIAGNOSIS — K74.69 OTHER CIRRHOSIS OF LIVER: ICD-10-CM

## 2025-01-14 DIAGNOSIS — L73.2 HIDRADENITIS SUPPURATIVA: ICD-10-CM

## 2025-01-14 DIAGNOSIS — F10.20 ALCOHOLISM (MULTI): ICD-10-CM

## 2025-01-14 DIAGNOSIS — G61.81 CHRONIC INFLAMMATORY DEMYELINATING POLYNEUROPATHY (MULTI): ICD-10-CM

## 2025-01-14 PROCEDURE — 99213 OFFICE O/P EST LOW 20 MIN: CPT | Performed by: NURSE PRACTITIONER

## 2025-01-14 PROCEDURE — 1123F ACP DISCUSS/DSCN MKR DOCD: CPT | Performed by: NURSE PRACTITIONER

## 2025-01-14 PROCEDURE — 1159F MED LIST DOCD IN RCRD: CPT | Performed by: NURSE PRACTITIONER

## 2025-01-14 RX ORDER — AMOXICILLIN AND CLAVULANATE POTASSIUM 875; 125 MG/1; MG/1
875 TABLET, FILM COATED ORAL 2 TIMES DAILY
Qty: 20 TABLET | Refills: 0 | Status: SHIPPED | OUTPATIENT
Start: 2025-01-14 | End: 2025-01-24

## 2025-01-14 RX ORDER — MUPIROCIN 20 MG/G
OINTMENT TOPICAL 3 TIMES DAILY
Qty: 22 G | Refills: 2 | Status: SHIPPED | OUTPATIENT
Start: 2025-01-14 | End: 2025-01-24

## 2025-01-14 RX ORDER — QUETIAPINE FUMARATE 50 MG/1
50 TABLET, FILM COATED ORAL NIGHTLY
Qty: 30 TABLET | Refills: 1 | Status: SHIPPED | OUTPATIENT
Start: 2025-01-14 | End: 2025-03-15

## 2025-01-14 RX ORDER — POLYETHYLENE GLYCOL 3350 17 G/17G
17 POWDER, FOR SOLUTION ORAL DAILY
Qty: 510 G | Refills: 3 | Status: SHIPPED | OUTPATIENT
Start: 2025-01-14

## 2025-01-14 ASSESSMENT — ENCOUNTER SYMPTOMS
DEPRESSION: 1
SLEEP QUALITY: GOOD

## 2025-01-14 NOTE — PROGRESS NOTES
Virtual or Telephone Consent    An interactive audio and video telecommunication system which permits real time communications between the patient (at the originating site) and provider (at the distant site) was utilized to provide this telehealth service.   Verbal consent was requested and obtained from Kylie Duong on this date, 02/02/25 for a telehealth visit.      Subjective   Patient ID: Kylie Duong is a 64 y.o. female who presents for Depression.  Depression  Visit Type: follow-up  Patient is not experiencing: nervousness/anxiety and shortness of breath.  Frequency of symptoms: most days    Sleep quality: good  Compliance with medications:  %    She reports her mood has been better with Seroquel but she has gained 4 lbs and has been very constipated.    Review of Systems   Constitutional:  Positive for unexpected weight change. Negative for chills, fatigue and fever.   HENT:  Negative for rhinorrhea and sore throat.    Respiratory:  Negative for shortness of breath.    Cardiovascular:  Negative for chest pain.   Gastrointestinal:  Positive for constipation. Negative for diarrhea.   Genitourinary:  Negative for dysuria.   Neurological:  Negative for headaches.   Psychiatric/Behavioral:  Positive for depression. The patient is not nervous/anxious.      Objective   Physical Exam  Constitutional:       Appearance: Normal appearance.   Neurological:      Mental Status: She is alert.   Psychiatric:         Mood and Affect: Mood normal.         Behavior: Behavior normal.         Thought Content: Thought content normal.         Judgment: Judgment normal.         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     1.14.25- stable, will continue to monitor         Chronic inflammatory demyelinating polyneuropathy (Multi)     1.14.25- stable, will continue to  monitor         Alcoholic cirrhosis (Multi)     1.14.25- stable, will continue to monitor         Alcoholism (Multi)     1.14.25- patient has been cutting down, will continue to monitor          Other Visit Diagnoses       Bipolar affective disorder, remission status unspecified (Multi)        Relevant Medications    QUEtiapine (SEROquel) 50 mg tablet    Hidradenitis suppurativa        Relevant Orders    Referral to Dermatology    Constipation, unspecified constipation type        Relevant Medications    polyethylene glycol (Glycolax, Miralax) 17 gram/dose powder          Patient Instructions   Start taking miralax as needed for constipation, continue all other meds as ordered. Follow-up in 3 months, or sooner if needed. Call the office if any problems or concerns in the meantime.     This visit was completed via telehealth due to the restrictions of the COVID-19 pandemic. All issues as below were discussed and addressed but no physical exam was performed. If it was felt that the patient should be evaluated in clinic then they were directed there. The patient verbally consented to visit.  Spent 10 minutes with pt face to face and more than 50% of this time was spent in counseling and coordination of care.       OKSANA Mccormick-CNP 02/02/25 1:20 PM

## 2025-01-17 ENCOUNTER — TELEPHONE (OUTPATIENT)
Dept: PRIMARY CARE | Facility: CLINIC | Age: 65
End: 2025-01-17
Payer: MEDICARE

## 2025-01-17 NOTE — TELEPHONE ENCOUNTER
Patient called concerned about weight gain from staring new med. She states she has gained 4 lbs since starting med 2 weeks ago.     QUEtiapine (SEROquel) 50 mg tablet        Sig: Take 1 tablet (50 mg) by mouth once daily at bedtime.        Sent to pharmacy as: QUEtiapine 50 mg tablet (SEROquel)            Please advise       Thank you !

## 2025-01-21 ENCOUNTER — TELEPHONE (OUTPATIENT)
Dept: PRIMARY CARE | Facility: CLINIC | Age: 65
End: 2025-01-21
Payer: MEDICARE

## 2025-01-21 NOTE — TELEPHONE ENCOUNTER
Patient called to inform she was given Augmentin by PCP    She took 4 doses.  Had severe heartburn and will not continue taking it.

## 2025-01-29 ENCOUNTER — TELEPHONE (OUTPATIENT)
Dept: PRIMARY CARE | Facility: CLINIC | Age: 65
End: 2025-01-29
Payer: MEDICARE

## 2025-01-29 NOTE — TELEPHONE ENCOUNTER
Patient states she was started on an anti-depressant, Seroquel.    She says she has been gaining weight.  5lbs in the last month.    Patient would like to try something different, she does not want to continue gaining weight.  If there is something that will help to lose weight it would be even better.

## 2025-02-02 ASSESSMENT — ENCOUNTER SYMPTOMS
NERVOUS/ANXIOUS: 0
FEVER: 0
CONSTIPATION: 1
SORE THROAT: 0
RHINORRHEA: 0
DIARRHEA: 0
CHILLS: 0
DYSURIA: 0
SHORTNESS OF BREATH: 0
HEADACHES: 0
FATIGUE: 0
UNEXPECTED WEIGHT CHANGE: 1

## 2025-02-02 NOTE — PATIENT INSTRUCTIONS
Start taking miralax as needed for constipation, continue all other meds as ordered. Follow-up in 3 months, or sooner if needed. Call the office if any problems or concerns in the meantime.

## 2025-02-11 ENCOUNTER — HOSPITAL ENCOUNTER (OUTPATIENT)
Dept: RADIOLOGY | Facility: CLINIC | Age: 65
Discharge: HOME | End: 2025-02-11
Payer: MEDICARE

## 2025-02-11 ENCOUNTER — OFFICE VISIT (OUTPATIENT)
Dept: ORTHOPEDIC SURGERY | Facility: CLINIC | Age: 65
End: 2025-02-11
Payer: MEDICARE

## 2025-02-11 DIAGNOSIS — M79.672 LEFT FOOT PAIN: ICD-10-CM

## 2025-02-11 DIAGNOSIS — M17.31 POST-TRAUMATIC OSTEOARTHRITIS OF RIGHT KNEE: ICD-10-CM

## 2025-02-11 DIAGNOSIS — M21.061 GENU VALGUM, ACQUIRED, RIGHT: ICD-10-CM

## 2025-02-11 DIAGNOSIS — M84.375A METATARSAL STRESS FRACTURE OF LEFT FOOT, INITIAL ENCOUNTER: Primary | ICD-10-CM

## 2025-02-11 PROCEDURE — 1123F ACP DISCUSS/DSCN MKR DOCD: CPT | Performed by: ORTHOPAEDIC SURGERY

## 2025-02-11 PROCEDURE — 73630 X-RAY EXAM OF FOOT: CPT | Mod: LEFT SIDE | Performed by: RADIOLOGY

## 2025-02-11 PROCEDURE — 73630 X-RAY EXAM OF FOOT: CPT | Mod: LT

## 2025-02-11 PROCEDURE — 99214 OFFICE O/P EST MOD 30 MIN: CPT | Performed by: ORTHOPAEDIC SURGERY

## 2025-02-11 NOTE — PATIENT INSTRUCTIONS
YOUR BOOT INSTRUCTIONS:  You can put weight on your foot and ankle while in the boot.    You can use crutches or walker for support as needed.   You should wear boot when walking or standing for 4 weeks.  You can take off your boot while bathing, sitting, stretching, icing or doing therapy exercises.  You can take your boot off at nighttime while sleeping.    BOOT WEANING:  DAY 1-- come out of boot for 1 hour (wear supportive athletic shoes and orthotic inserts), rest of the day in boot  DAY 2-- come out of boot for 2 hours (wear supportive athletic shoes and orthotic inserts), rest of the day in boot  DAY 3-- come out of boot for 3 hours (wear supportive athletic shoes and orthotic inserts), rest of the day in boot  DAY 4-- come out of boot for 4 hours (wear supportive athletic shoes and orthotic inserts), rest of the day in boot  DAY 5-- come out of boot and wear supportive shoes and orthotic inserts  * if you have pain while weaning out of boot then go back one day in the protocol (i.e. If really sore on the morning of Day 3 from coming out of boot for 2 hours the previous day, go back to Day 1 and start again through the protocol)    You can also use OTC Voltaren gel or  aspercreme ointment and apply it to the injured area.      Ice and elevate supported at the calf to reduce swelling    Patient will increase their dietary calcium intake (milk,yogurt) and should get 1200 mg of calcium per day. They will also take a vitamin D supplement.    You can use a bucket of room temperature water with Epson salt and do your ankle/toe exercises    Patient was given a rx for an OA knee brace due to their genu varum deformity, thigh/knee/calf morphology and to improve the patient's pain, reduce medications, defer total knee replacement, improve patient's function and ADL's. Patient is ambulatory and brace will be used for a duration greater than 6 months.    Follow up in 4-6 weeks

## 2025-02-11 NOTE — PROGRESS NOTES
History of Present Illness  64 y.o.female here for left foot josselin  1. Metatarsal stress fracture of left foot, initial encounter  Osteoarthritis Knee Brace, Lateral, Adjustable      2. Left foot pain  XR foot left 3+ views      3. Post-traumatic osteoarthritis of right knee  Osteoarthritis Knee Brace, Lateral, Adjustable      4. Genu valgum, acquired, right  Osteoarthritis Knee Brace, Lateral, Adjustable        Mechanism of injury: stepped on carpet divider barefoot  Date of Injury/Pain: 10 days ago  Location of pain: lateral foot  Frequency of Pain: worse with walking or standing  Associated symptoms? Swelling.  Previous treatment?  Walking boot      REVIEW OF SYSTEMS  27 point review of systems negative except what is stated in HPI    Constitutional Exam: patient's height and weight reviewed, well-kempt  Psychiatric Exam: alert and oriented x 3, appropriate mood and behavior  Eye Exam: KIRSTIN PORRAS  Pulmonary Exam: breathing non-labored, no apparent distress  Lymphatic exam: no appreciable lymphadenopathy in the lower extremities  Cardiovascular exam: DP pulses 2+ bilaterally, PT 2+ bilaterally, toes are pink with good capillary refill, no pitting edema  Skin exam: no open lesions, rashes, abrasions or ulcerations  Neurological exam: sensation to light touch intact in both lower extremities in peripheral and dermatomal distributions (except for any abnormalities noted in musculoskeletal exam)    PHYSICAL EXAM  On examination of the left ankle/foot:  Normal gait in boot  Normal alignment  Minimal swelling and no ecchymosis of the lateral foot. no erythema. Skin intact; no ulcers or lesions.   Normal ROM in  ankle plantarflexion, dorsiflexion, inversion and eversion; minimal Normal ROM in 2-5th toe flexion/extension and no 1st MTP flexion/extension  Normal strength in ankle plantarflexion, dorsiflexion, inversion and eversion; normal strength with great toe flexion/extension. No pain with eversion  Tenderness to  palpation: 5th metatarsal   No tenderness to palpation over the Achilles, medial and lateral malleolus, posterior tibial tendon, peroneal tendon, ATFL, deltoid ligament, talus or navicular.  no tenderness to palpation over heel, plantar arch, , base of 1st metatarsal/2nd metatarsal, sesamoids, medial cuneiform, navicular, 1st MTP joint or 2nd or 3rd intermetarsal space.  Neurovascularly intact. Good capillary refill. No sensory deficit to light touch. Normal proprioception. Dorsalis pedis and posterior tibial pulses 2+ bilaterally.    - Villeda's test                              - Dorsiflexion-eversion test  - Anterior Drawer test                      - resisted dorsiflexion-eversion test  - Talar tilt test                                    - shuck testing  - Squeeze test    Right knee genu valgum    IMAGING  I personally reviewed the patient's x-ray images and reports of the left foot. The xrays show that hardware is intact and in appropriate alignment.  Normal joint spacing. No fractures or dislocations          ASSESSMENT: left foot 5th metatarsal stress injury, right knee post-traumatic arthritis genu valgum    PLAN: Treatment options were discussed with the patient. Patient was placed in a tall CAM walker boot to be WBAT with crutches.  They were given boot instructions. Patient will increase their dietary calcium intake (milk,yogurt) and should get 1200 mg of calcium per day. They will also take a vitamin D supplement. Patient was given a handout and instructed on an at home stretching program.  They should do these exercises 3 times per day for 6 weeks and then daily. Patient can use OTC Voltaren gel, biofreeze or  aspercream for pain and continue to ice and elevate supported at the calf to reduce swelling. All the patient's questions were answered. The patient agrees with the above plan.  Follow up in 4-6 weeks with repeat left foot xrays with AP, lateral and oblique views to evaluate bone  healing.    Patient was given a rx for an OA knee brace due to their genu varum deformity, thigh/knee/calf morphology and to improve the patient's pain, reduce medications, defer total knee replacement, improve patient's function and ADL's. Patient is ambulatory and brace will be used for a duration greater than 6 months.

## 2025-03-12 ENCOUNTER — TELEPHONE (OUTPATIENT)
Dept: PRIMARY CARE | Facility: CLINIC | Age: 65
End: 2025-03-12
Payer: MEDICARE

## 2025-03-12 NOTE — TELEPHONE ENCOUNTER
Patient is requesting the specific name of the dermatologist that Ferny is recommending that will help with her specific problem.    She does not want the names of offices but the name of the doctor.    Please call

## 2025-03-18 ENCOUNTER — APPOINTMENT (OUTPATIENT)
Dept: ORTHOPEDIC SURGERY | Facility: CLINIC | Age: 65
End: 2025-03-18
Payer: MEDICARE

## 2025-03-19 DIAGNOSIS — F32.A DEPRESSION, UNSPECIFIED DEPRESSION TYPE: Primary | ICD-10-CM

## 2025-03-19 DIAGNOSIS — G25.81 RESTLESS LEGS SYNDROME (RLS): ICD-10-CM

## 2025-03-20 ENCOUNTER — TELEPHONE (OUTPATIENT)
Dept: PRIMARY CARE | Facility: CLINIC | Age: 65
End: 2025-03-20
Payer: MEDICARE

## 2025-03-20 RX ORDER — ROPINIROLE 2 MG/1
2 TABLET, FILM COATED ORAL NIGHTLY
Qty: 90 TABLET | Refills: 0 | Status: SHIPPED | OUTPATIENT
Start: 2025-03-20 | End: 2025-06-18

## 2025-03-20 NOTE — TELEPHONE ENCOUNTER
Patient called and stated that the Vraylar that was sent in was $462 dollars a month and she can not afford that every month. Asking if something else that is a little cheaper can be sent in for her?

## 2025-05-13 ENCOUNTER — APPOINTMENT (OUTPATIENT)
Dept: NEUROLOGY | Facility: CLINIC | Age: 65
End: 2025-05-13
Payer: MEDICARE

## 2025-07-15 ENCOUNTER — APPOINTMENT (OUTPATIENT)
Dept: PRIMARY CARE | Facility: CLINIC | Age: 65
End: 2025-07-15
Payer: MEDICARE